# Patient Record
Sex: FEMALE | Race: WHITE | Employment: UNEMPLOYED | URBAN - METROPOLITAN AREA
[De-identification: names, ages, dates, MRNs, and addresses within clinical notes are randomized per-mention and may not be internally consistent; named-entity substitution may affect disease eponyms.]

---

## 2020-01-28 PROBLEM — Z78.9 ADMITTED TO SUBSTANCE MISUSE DETOXIFICATION CENTER: Status: ACTIVE | Noted: 2020-01-28

## 2020-08-05 ENCOUNTER — HOSPITAL ENCOUNTER (EMERGENCY)
Age: 30
Discharge: HOME | End: 2020-08-05
Attending: EMERGENCY MEDICINE
Payer: COMMERCIAL

## 2020-08-05 ENCOUNTER — APPOINTMENT (OUTPATIENT)
Dept: ULTRASOUND IMAGING | Age: 30
End: 2020-08-05
Payer: COMMERCIAL

## 2020-08-05 ENCOUNTER — APPOINTMENT (OUTPATIENT)
Dept: CT IMAGING | Age: 30
End: 2020-08-05
Payer: COMMERCIAL

## 2020-08-05 VITALS
RESPIRATION RATE: 14 BRPM | TEMPERATURE: 97.9 F | HEART RATE: 93 BPM | OXYGEN SATURATION: 98 % | DIASTOLIC BLOOD PRESSURE: 58 MMHG | SYSTOLIC BLOOD PRESSURE: 108 MMHG

## 2020-08-05 LAB
ABSOLUTE EOS #: 0.05 K/UL (ref 0–0.44)
ABSOLUTE IMMATURE GRANULOCYTE: 0.01 K/UL (ref 0–0.3)
ABSOLUTE LYMPH #: 2.26 K/UL (ref 1.1–3.7)
ABSOLUTE MONO #: 0.33 K/UL (ref 0.1–1.2)
ALBUMIN SERPL-MCNC: 3.6 G/DL (ref 3.5–5.2)
ALBUMIN/GLOBULIN RATIO: ABNORMAL (ref 1–2.5)
ALP BLD-CCNC: 100 U/L (ref 35–104)
ALT SERPL-CCNC: 120 U/L (ref 5–33)
ANION GAP SERPL CALCULATED.3IONS-SCNC: 11 MMOL/L (ref 9–17)
AST SERPL-CCNC: 69 U/L
BASOPHILS # BLD: 0 % (ref 0–2)
BASOPHILS ABSOLUTE: <0.03 K/UL (ref 0–0.2)
BILIRUB SERPL-MCNC: 0.33 MG/DL (ref 0.3–1.2)
BILIRUBIN DIRECT: 0.09 MG/DL
BILIRUBIN, INDIRECT: 0.24 MG/DL (ref 0–1)
BNP INTERPRETATION: ABNORMAL
BUN BLDV-MCNC: 4 MG/DL (ref 6–20)
BUN/CREAT BLD: 6 (ref 9–20)
CALCIUM SERPL-MCNC: 9.2 MG/DL (ref 8.6–10.4)
CHLORIDE BLD-SCNC: 101 MMOL/L (ref 98–107)
CO2: 27 MMOL/L (ref 20–31)
CREAT SERPL-MCNC: 0.7 MG/DL (ref 0.5–0.9)
DIFFERENTIAL TYPE: ABNORMAL
EOSINOPHILS RELATIVE PERCENT: 1 % (ref 1–4)
GFR AFRICAN AMERICAN: >60 ML/MIN
GFR NON-AFRICAN AMERICAN: >60 ML/MIN
GFR SERPL CREATININE-BSD FRML MDRD: ABNORMAL ML/MIN/{1.73_M2}
GFR SERPL CREATININE-BSD FRML MDRD: ABNORMAL ML/MIN/{1.73_M2}
GLOBULIN: ABNORMAL G/DL (ref 1.5–3.8)
GLUCOSE BLD-MCNC: 133 MG/DL (ref 70–99)
HCG QUALITATIVE: NEGATIVE
HCT VFR BLD CALC: 33.9 % (ref 36.3–47.1)
HEMOGLOBIN: 11.2 G/DL (ref 11.9–15.1)
IMMATURE GRANULOCYTES: 0 %
LIPASE: 34 U/L (ref 13–60)
LYMPHOCYTES # BLD: 55 % (ref 24–43)
MCH RBC QN AUTO: 32.8 PG (ref 25.2–33.5)
MCHC RBC AUTO-ENTMCNC: 33 G/DL (ref 28.4–34.8)
MCV RBC AUTO: 99.4 FL (ref 82.6–102.9)
MONOCYTES # BLD: 8 % (ref 3–12)
NRBC AUTOMATED: 0 PER 100 WBC
PDW BLD-RTO: 13.7 % (ref 11.8–14.4)
PLATELET # BLD: 152 K/UL (ref 138–453)
PLATELET ESTIMATE: ABNORMAL
PMV BLD AUTO: 8.6 FL (ref 8.1–13.5)
POTASSIUM SERPL-SCNC: 4.1 MMOL/L (ref 3.7–5.3)
PRO-BNP: 502 PG/ML
RBC # BLD: 3.41 M/UL (ref 3.95–5.11)
RBC # BLD: ABNORMAL 10*6/UL
SEG NEUTROPHILS: 36 % (ref 36–65)
SEGMENTED NEUTROPHILS ABSOLUTE COUNT: 1.5 K/UL (ref 1.5–8.1)
SODIUM BLD-SCNC: 139 MMOL/L (ref 135–144)
TOTAL PROTEIN: 6.6 G/DL (ref 6.4–8.3)
WBC # BLD: 4.2 K/UL (ref 3.5–11.3)
WBC # BLD: ABNORMAL 10*3/UL

## 2020-08-05 PROCEDURE — 6360000002 HC RX W HCPCS: Performed by: NURSE PRACTITIONER

## 2020-08-05 PROCEDURE — 74177 CT ABD & PELVIS W/CONTRAST: CPT

## 2020-08-05 PROCEDURE — 83880 ASSAY OF NATRIURETIC PEPTIDE: CPT

## 2020-08-05 PROCEDURE — 6360000004 HC RX CONTRAST MEDICATION: Performed by: NURSE PRACTITIONER

## 2020-08-05 PROCEDURE — 80048 BASIC METABOLIC PNL TOTAL CA: CPT

## 2020-08-05 PROCEDURE — 2580000003 HC RX 258: Performed by: NURSE PRACTITIONER

## 2020-08-05 PROCEDURE — 84703 CHORIONIC GONADOTROPIN ASSAY: CPT

## 2020-08-05 PROCEDURE — 96374 THER/PROPH/DIAG INJ IV PUSH: CPT

## 2020-08-05 PROCEDURE — 76705 ECHO EXAM OF ABDOMEN: CPT

## 2020-08-05 PROCEDURE — 80076 HEPATIC FUNCTION PANEL: CPT

## 2020-08-05 PROCEDURE — C9113 INJ PANTOPRAZOLE SODIUM, VIA: HCPCS | Performed by: NURSE PRACTITIONER

## 2020-08-05 PROCEDURE — 96375 TX/PRO/DX INJ NEW DRUG ADDON: CPT

## 2020-08-05 PROCEDURE — 85025 COMPLETE CBC W/AUTO DIFF WBC: CPT

## 2020-08-05 PROCEDURE — 83690 ASSAY OF LIPASE: CPT

## 2020-08-05 PROCEDURE — 99284 EMERGENCY DEPT VISIT MOD MDM: CPT

## 2020-08-05 RX ORDER — ONDANSETRON 2 MG/ML
4 INJECTION INTRAMUSCULAR; INTRAVENOUS ONCE
Status: COMPLETED | OUTPATIENT
Start: 2020-08-05 | End: 2020-08-05

## 2020-08-05 RX ORDER — SODIUM CHLORIDE 0.9 % (FLUSH) 0.9 %
10 SYRINGE (ML) INJECTION PRN
Status: DISCONTINUED | OUTPATIENT
Start: 2020-08-05 | End: 2020-08-05 | Stop reason: HOSPADM

## 2020-08-05 RX ORDER — BUPROPION HYDROCHLORIDE 300 MG/1
TABLET ORAL
Status: ON HOLD | COMMUNITY
End: 2021-03-24 | Stop reason: HOSPADM

## 2020-08-05 RX ORDER — SODIUM CHLORIDE 9 MG/ML
10 INJECTION INTRAVENOUS ONCE
Status: COMPLETED | OUTPATIENT
Start: 2020-08-05 | End: 2020-08-05

## 2020-08-05 RX ORDER — METRONIDAZOLE 500 MG/1
500 TABLET ORAL 3 TIMES DAILY
Qty: 30 TABLET | Refills: 0 | Status: SHIPPED | OUTPATIENT
Start: 2020-08-05 | End: 2020-08-15

## 2020-08-05 RX ORDER — VILAZODONE HYDROCHLORIDE 20 MG/1
20 TABLET ORAL DAILY
Status: ON HOLD | COMMUNITY
End: 2021-03-19 | Stop reason: HOSPADM

## 2020-08-05 RX ORDER — PANTOPRAZOLE SODIUM 40 MG/10ML
40 INJECTION, POWDER, LYOPHILIZED, FOR SOLUTION INTRAVENOUS ONCE
Status: COMPLETED | OUTPATIENT
Start: 2020-08-05 | End: 2020-08-05

## 2020-08-05 RX ORDER — GUANFACINE 2 MG/1
2 TABLET, EXTENDED RELEASE ORAL DAILY
Status: ON HOLD | COMMUNITY
End: 2021-03-24 | Stop reason: HOSPADM

## 2020-08-05 RX ORDER — CIPROFLOXACIN 500 MG/1
500 TABLET, FILM COATED ORAL 2 TIMES DAILY
Qty: 20 TABLET | Refills: 0 | Status: SHIPPED | OUTPATIENT
Start: 2020-08-05 | End: 2020-08-15

## 2020-08-05 RX ORDER — BUSPIRONE HYDROCHLORIDE 10 MG/1
5 TABLET ORAL 2 TIMES DAILY
Status: ON HOLD | COMMUNITY
End: 2021-03-24 | Stop reason: HOSPADM

## 2020-08-05 RX ADMIN — Medication 10 ML: at 14:05

## 2020-08-05 RX ADMIN — Medication 10 ML: at 13:23

## 2020-08-05 RX ADMIN — IOPAMIDOL 100 ML: 755 INJECTION, SOLUTION INTRAVENOUS at 14:05

## 2020-08-05 RX ADMIN — PANTOPRAZOLE SODIUM 40 MG: 40 INJECTION, POWDER, FOR SOLUTION INTRAVENOUS at 13:22

## 2020-08-05 RX ADMIN — ONDANSETRON 4 MG: 2 INJECTION INTRAMUSCULAR; INTRAVENOUS at 13:22

## 2020-08-05 ASSESSMENT — ENCOUNTER SYMPTOMS
COLOR CHANGE: 0
COUGH: 0
BACK PAIN: 0
NAUSEA: 1
DIARRHEA: 1
SORE THROAT: 0
ABDOMINAL PAIN: 1
VOMITING: 0
SHORTNESS OF BREATH: 0

## 2020-08-05 ASSESSMENT — PAIN DESCRIPTION - PAIN TYPE: TYPE: ACUTE PAIN

## 2020-08-05 ASSESSMENT — PAIN SCALES - GENERAL: PAINLEVEL_OUTOF10: 8

## 2020-08-05 NOTE — ED PROVIDER NOTES
with likely fatty infiltration. Spleen, pancreas, adrenal glands and kidneys appear unremarkable. Partially contracted gallbladder with slight wall thickening/enhancement. GI/Bowel: No evidence of bowel obstruction. Mild wall thickening/enhancement along the cecum and ascending colon with slight adjacent edema. Pelvis: Bladder and uterus appear unremarkable Peritoneum/Retroperitoneum: Normal caliber abdominal aorta. No suspicious lymphadenopathy. No ascites or free air. Bones/Soft Tissues: No significant osseous abnormality. 1. Mild wall thickening/enhancement along the cecum and ascending colon with slight adjacent edema suggesting colitis. 2. Slight wall thickening/enhancement of the gallbladder which may in part relate to partially contracted state. Consider further assessment with ultrasound as clinically warranted. 3. Borderline hepatomegaly with likely fatty infiltration which could also be further assessed with ultrasound. 4. Small benign cardiophrenic nodular densities as described. Us Gallbladder Ruq    Result Date: 8/5/2020  EXAMINATION: RIGHT UPPER QUADRANT ULTRASOUND 8/5/2020 2:59 pm COMPARISON: CT abdomen pelvis earlier same day HISTORY: ORDERING SYSTEM PROVIDED HISTORY: RUQ abdominal pain indeterminate CT FINDINGS: LIVER:  There is increased echogenicity of the liver suggesting fatty infiltration. No evidence of intrahepatic biliary ductal dilatation. Liver is mildly prominent measuring 20.1 cm. BILIARY SYSTEM:  Gallbladder is partially contracted but without evidence of pericholecystic fluid, significant wall thickening or stones. Negative sonographic Connell's sign. Common bile duct is within normal limits measuring 3 mm. RIGHT KIDNEY: The right kidney is grossly unremarkable without evidence of hydronephrosis. Right renal length is 11.2 cm. PANCREAS:  Visualized portions of the pancreas are unremarkable. OTHER: No evidence of right upper quadrant ascites.      Redemonstration of mild worsens. FINAL IMPRESSION      1. Abdominal pain, unspecified abdominal location    2.  Colitis            Problem List  Patient Active Problem List   Diagnosis Code    Admitted to substance misuse detoxification center Z78.9         DISPOSITION/PLAN   DISPOSITION Decision To Discharge 08/05/2020 03:51:44 PM      PATIENT REFERRED TO:   Call Aleyda Mcbride to establish care for follow up    Schedule an appointment as soon as possible for a visit       [facility] ED  [address] [facility]  [phone]          DISCHARGE MEDICATIONS:     New Prescriptions    CIPROFLOXACIN (CIPRO) 500 MG TABLET    Take 1 tablet by mouth 2 times daily for 10 days    METRONIDAZOLE (FLAGYL) 500 MG TABLET    Take 1 tablet by mouth 3 times daily for 10 days           (Please note that portions of this note were completed with a voice recognition program.  Efforts were made to edit the dictations but occasionally words are mis-transcribed.)    RENARD Llanes CNP, APRN - CNP  08/05/20 7076

## 2020-08-05 NOTE — ED PROVIDER NOTES
EMERGENCY DEPARTMENT ENCOUNTER   ATTENDING ATTESTATION     Pt Name: Yuli Alanis  MRN: [id number]  [place] 1990  Date of evaluation: 8/5/20       Yuli Alanis is a 31 y.o. female who presents with Leg Swelling and Abdominal Pain (hep c)      MDM:   This is a 58-year-old female who is currently in rehab for fentanyl who comes in today with abdominal pain and leg swelling. Mild global abdominal tenderness. 3:52 PM EDT  CT scan reveals a compressed gallbladder but ultrasound of the gallbladder does not reveal any cholecystitis. The patient does have evidence of colitis on her CT scan. However, there is no leukocytosis she has no evidence of sepsis at this time. We will discharge with Cipro Flagyl      Vitals:   Vitals:    08/05/20 1240   BP: (!) 108/58   Pulse: 93   Resp: 14   Temp: 97.9 °F (36.6 °C)   SpO2: 98%         I personally evaluated and examined the patient in conjunction with the resident and agree with the assessment, treatment plan, and disposition of the patient as recorded by the resident. I performed a history and physical examination of the patient and discussed management with the resident. I reviewed the residents note and agree with the documented findings and plan of care. Any areas of disagreement are noted on the chart. I was personally present for the key portions of any procedures. I have documented in the chart those procedures where I was not present during the key portions. I have personally reviewed all images and agree with the resident's interpretation. I have reviewed the emergency nurses triage note. I agree with the chief complaint, past medical history, past surgical history, allergies, medications, social and family history as documented unless otherwise noted.     Mckenna Cesar MD  Attending Emergency Physician            Mckenna Cesar MD  08/05/20 9445

## 2020-08-05 NOTE — ED NOTES
Pt ambulatory to room 19. Pt c/o right-sided abdominal pain and bilateral leg swelling x1 week. Pt denies N/V and diarrhea. Pt states she vomited once 2 days ago, and her stool this morning was darker than usual. Pt has a hx of hepatitis C. Pt A&O x3, respirations equal and unlabored bilat, skin warm and dry.       Ambreen Rose, RN  08/05/20 0857

## 2020-08-21 ENCOUNTER — APPOINTMENT (OUTPATIENT)
Dept: ULTRASOUND IMAGING | Age: 30
End: 2020-08-21
Payer: COMMERCIAL

## 2020-08-21 ENCOUNTER — HOSPITAL ENCOUNTER (EMERGENCY)
Age: 30
Discharge: HOME | End: 2020-08-21
Attending: EMERGENCY MEDICINE
Payer: COMMERCIAL

## 2020-08-21 VITALS
OXYGEN SATURATION: 97 % | BODY MASS INDEX: 31.4 KG/M2 | HEART RATE: 92 BPM | WEIGHT: 183.9 LBS | HEIGHT: 64 IN | RESPIRATION RATE: 14 BRPM | DIASTOLIC BLOOD PRESSURE: 74 MMHG | TEMPERATURE: 98.2 F | SYSTOLIC BLOOD PRESSURE: 130 MMHG

## 2020-08-21 LAB
ABSOLUTE EOS #: <0.03 K/UL (ref 0–0.44)
ABSOLUTE IMMATURE GRANULOCYTE: 0.04 K/UL (ref 0–0.3)
ABSOLUTE LYMPH #: 2.87 K/UL (ref 1.1–3.7)
ABSOLUTE MONO #: 0.53 K/UL (ref 0.1–1.2)
ALBUMIN SERPL-MCNC: 4.2 G/DL (ref 3.5–5.2)
ALBUMIN/GLOBULIN RATIO: ABNORMAL (ref 1–2.5)
ALP BLD-CCNC: 130 U/L (ref 35–104)
ALT SERPL-CCNC: 79 U/L (ref 5–33)
AMYLASE: 89 U/L (ref 28–100)
ANION GAP SERPL CALCULATED.3IONS-SCNC: 12 MMOL/L (ref 9–17)
AST SERPL-CCNC: 60 U/L
BASOPHILS # BLD: 0 % (ref 0–2)
BASOPHILS ABSOLUTE: <0.03 K/UL (ref 0–0.2)
BILIRUB SERPL-MCNC: 0.3 MG/DL (ref 0.3–1.2)
BILIRUBIN DIRECT: <0.08 MG/DL
BILIRUBIN, INDIRECT: ABNORMAL MG/DL (ref 0–1)
BUN BLDV-MCNC: 13 MG/DL (ref 6–20)
BUN/CREAT BLD: 15 (ref 9–20)
CALCIUM SERPL-MCNC: 9.3 MG/DL (ref 8.6–10.4)
CHLORIDE BLD-SCNC: 101 MMOL/L (ref 98–107)
CO2: 24 MMOL/L (ref 20–31)
CREAT SERPL-MCNC: 0.84 MG/DL (ref 0.5–0.9)
DIFFERENTIAL TYPE: ABNORMAL
EOSINOPHILS RELATIVE PERCENT: 0 % (ref 1–4)
GFR AFRICAN AMERICAN: >60 ML/MIN
GFR NON-AFRICAN AMERICAN: >60 ML/MIN
GFR SERPL CREATININE-BSD FRML MDRD: ABNORMAL ML/MIN/{1.73_M2}
GFR SERPL CREATININE-BSD FRML MDRD: ABNORMAL ML/MIN/{1.73_M2}
GLOBULIN: ABNORMAL G/DL (ref 1.5–3.8)
GLUCOSE BLD-MCNC: 105 MG/DL (ref 70–99)
HCT VFR BLD CALC: 39.8 % (ref 36.3–47.1)
HEMOGLOBIN: 13.3 G/DL (ref 11.9–15.1)
IMMATURE GRANULOCYTES: 1 %
LIPASE: 73 U/L (ref 13–60)
LYMPHOCYTES # BLD: 35 % (ref 24–43)
MCH RBC QN AUTO: 33 PG (ref 25.2–33.5)
MCHC RBC AUTO-ENTMCNC: 33.4 G/DL (ref 28.4–34.8)
MCV RBC AUTO: 98.8 FL (ref 82.6–102.9)
MONOCYTES # BLD: 7 % (ref 3–12)
NRBC AUTOMATED: 0 PER 100 WBC
PDW BLD-RTO: 13.6 % (ref 11.8–14.4)
PLATELET # BLD: 224 K/UL (ref 138–453)
PLATELET ESTIMATE: ABNORMAL
PMV BLD AUTO: 8.3 FL (ref 8.1–13.5)
POTASSIUM SERPL-SCNC: 4.4 MMOL/L (ref 3.7–5.3)
RBC # BLD: 4.03 M/UL (ref 3.95–5.11)
RBC # BLD: ABNORMAL 10*6/UL
SEG NEUTROPHILS: 57 % (ref 36–65)
SEGMENTED NEUTROPHILS ABSOLUTE COUNT: 4.67 K/UL (ref 1.5–8.1)
SODIUM BLD-SCNC: 137 MMOL/L (ref 135–144)
TOTAL PROTEIN: 7.6 G/DL (ref 6.4–8.3)
WBC # BLD: 8.2 K/UL (ref 3.5–11.3)
WBC # BLD: ABNORMAL 10*3/UL

## 2020-08-21 PROCEDURE — 85025 COMPLETE CBC W/AUTO DIFF WBC: CPT

## 2020-08-21 PROCEDURE — 82150 ASSAY OF AMYLASE: CPT

## 2020-08-21 PROCEDURE — 83690 ASSAY OF LIPASE: CPT

## 2020-08-21 PROCEDURE — 96374 THER/PROPH/DIAG INJ IV PUSH: CPT

## 2020-08-21 PROCEDURE — 6360000002 HC RX W HCPCS: Performed by: NURSE PRACTITIONER

## 2020-08-21 PROCEDURE — 76705 ECHO EXAM OF ABDOMEN: CPT

## 2020-08-21 PROCEDURE — 80076 HEPATIC FUNCTION PANEL: CPT

## 2020-08-21 PROCEDURE — 2580000003 HC RX 258: Performed by: NURSE PRACTITIONER

## 2020-08-21 PROCEDURE — 96372 THER/PROPH/DIAG INJ SC/IM: CPT

## 2020-08-21 PROCEDURE — 99284 EMERGENCY DEPT VISIT MOD MDM: CPT

## 2020-08-21 PROCEDURE — 80048 BASIC METABOLIC PNL TOTAL CA: CPT

## 2020-08-21 RX ORDER — DICYCLOMINE HYDROCHLORIDE 10 MG/ML
20 INJECTION INTRAMUSCULAR ONCE
Status: COMPLETED | OUTPATIENT
Start: 2020-08-21 | End: 2020-08-21

## 2020-08-21 RX ORDER — ONDANSETRON 4 MG/1
4 TABLET, ORALLY DISINTEGRATING ORAL EVERY 8 HOURS PRN
Qty: 20 TABLET | Refills: 0 | Status: SHIPPED | OUTPATIENT
Start: 2020-08-21 | End: 2020-09-04

## 2020-08-21 RX ORDER — 0.9 % SODIUM CHLORIDE 0.9 %
1000 INTRAVENOUS SOLUTION INTRAVENOUS ONCE
Status: COMPLETED | OUTPATIENT
Start: 2020-08-21 | End: 2020-08-21

## 2020-08-21 RX ORDER — ONDANSETRON 2 MG/ML
4 INJECTION INTRAMUSCULAR; INTRAVENOUS ONCE
Status: COMPLETED | OUTPATIENT
Start: 2020-08-21 | End: 2020-08-21

## 2020-08-21 RX ORDER — DICYCLOMINE HYDROCHLORIDE 10 MG/1
10 CAPSULE ORAL EVERY 6 HOURS PRN
Qty: 20 CAPSULE | Refills: 0 | Status: SHIPPED | OUTPATIENT
Start: 2020-08-21 | End: 2020-09-04

## 2020-08-21 RX ADMIN — SODIUM CHLORIDE 1000 ML: 9 INJECTION, SOLUTION INTRAVENOUS at 10:41

## 2020-08-21 RX ADMIN — DICYCLOMINE HYDROCHLORIDE 20 MG: 10 INJECTION INTRAMUSCULAR at 12:12

## 2020-08-21 RX ADMIN — ONDANSETRON 4 MG: 2 INJECTION INTRAMUSCULAR; INTRAVENOUS at 10:26

## 2020-08-21 ASSESSMENT — ENCOUNTER SYMPTOMS
COUGH: 0
CONSTIPATION: 0
DIARRHEA: 0
VOMITING: 1
WHEEZING: 0
SINUS PRESSURE: 0
SORE THROAT: 0
RHINORRHEA: 0
ABDOMINAL PAIN: 0
SHORTNESS OF BREATH: 0
NAUSEA: 1
COLOR CHANGE: 0

## 2020-08-21 ASSESSMENT — PAIN SCALES - GENERAL: PAINLEVEL_OUTOF10: 5

## 2020-08-21 NOTE — ED PROVIDER NOTES
[address] [place] [address] ED  eMERGENCY dEPARTMENT eNCOUnter      Pt Name: Arthur Valdez  MRN: [id number]  [place] 1990  Date of evaluation: 8/21/2020  Provider: [address] [place] [address] NP, RENARD Latham 0573       Chief Complaint   Patient presents with    Abdominal Pain    Emesis         HISTORY OF PRESENT ILLNESS  (Location/Symptom, Timing/Onset, Context/Setting, Quality, Duration, Modifying Factors, Severity.)   Arthur Valdez is a 31 y.o. female who presents to the emergency department by private vehicle for evaluation of nausea vomiting and some abdominal discomfort. Patient was seen and evaluated here on 5 August for abdominal pain and dark stools. She was found to have colitis at that time. She was discharged home on Cipro and Flagyl. She states she would take the medication as prescribed. She states in the last day of taking the antibiotic she started having nausea and vomiting. She states that she vomits roughly 2 or 3 times a day. She still has some pain in her lower abdomen but she also has some pain in her right upper abdomen as well. She also has a burning sensation to the middle of her chest.  She denies any history of any GI problems except for hepatitis C. She states that she has not had a measured fever but she has had some hot and cold sweats. Nursing Notes were reviewed.     ALLERGIES     Aspirin; Codeine; and Motrin [ibuprofen]    CURRENT MEDICATIONS       Discharge Medication List as of 8/21/2020 11:46 AM      CONTINUE these medications which have NOT CHANGED    Details   busPIRone (BUSPAR) 10 MG tablet Take 5 mg by mouth 2 times dailyHistorical Med      buPROPion (WELLBUTRIN XL) 300 MG extended release tablet 1 tablet in the morningHistorical Med      vilazodone HCl (VILAZODONE HCL) 20 MG TABS Take 20 mg by mouth dailyHistorical Med      guanFACINE (INTUNIV) 2 MG TB24 extended release tablet Take 2 mg by mouth dailyHistorical Med      lamoTRIgine (LAMICTAL) 100 MG tablet Take 75 mg by mouth 2 times daily Historical Med      traZODone (DESYREL) 100 MG tablet Take 150 mg by mouth nightly Historical Med             PAST MEDICAL HISTORY         Diagnosis Date    Anxiety     Borderline personality disorder ([address]     Depression     Disease of blood and blood forming organ     Drug use     Hepatitis C     History of iron deficiency anemia     Liver disease        SURGICAL HISTORY           Procedure Laterality Date    APPENDECTOMY      APPENDECTOMY Right          FAMILY HISTORY     History reviewed. No pertinent family history. No family status information on file. SOCIAL HISTORY      reports that she has been smoking cigarettes. She has been smoking about 1.00 pack per day. She has never used smokeless tobacco. She reports current drug use. Drugs: IV, Opiates , and Marijuana. She reports that she does not drink alcohol. REVIEW OF SYSTEMS    (2-9 systems for level 4, 10 or more for level 5)     Review of Systems   Constitutional: Negative for chills, fever and unexpected weight change. HENT: Negative for congestion, rhinorrhea, sinus pressure and sore throat. Respiratory: Negative for cough, shortness of breath and wheezing. Cardiovascular: Negative for chest pain and palpitations. Gastrointestinal: Positive for nausea and vomiting. Negative for abdominal pain, constipation and diarrhea. Genitourinary: Negative for dysuria and hematuria. Musculoskeletal: Negative for arthralgias and myalgias. Skin: Negative for color change and rash. Neurological: Negative for dizziness, weakness and headaches. Hematological: Negative for adenopathy. All other systems reviewed and are negative. Except as noted above the remainder of the review of systems was reviewed and negative.      PHYSICAL EXAM    (up to 7 for level 4, 8 or more for level 5)     ED Triage Vitals   BP Temp Temp src Pulse Resp SpO2 Height Weight   08/21/20 1000 08/21/20 1000 -- 08/21/20 1000 08/21/20 1000 08/21/20 1000 08/21/20 0959 08/21/20 0959   130/74 98.2 °F (36.8 °C)  92 14 97 % 5' 4\" (1.626 m) 183 lb 14.4 oz (83.4 kg)       Physical Exam  Vitals signs reviewed. Constitutional:       Appearance: She is well-developed. HENT:      Head: Normocephalic and atraumatic. Eyes:      Conjunctiva/sclera: Conjunctivae normal.      Pupils: Pupils are equal, round, and reactive to light. Neck:      Musculoskeletal: Normal range of motion and neck supple. Cardiovascular:      Rate and Rhythm: Normal rate and regular rhythm. Pulmonary:      Effort: Pulmonary effort is normal. No respiratory distress. Breath sounds: Normal breath sounds. No stridor. Abdominal:      General: Bowel sounds are normal.      Palpations: Abdomen is soft. Musculoskeletal: Normal range of motion. Lymphadenopathy:      Cervical: No cervical adenopathy. Skin:     General: Skin is warm and dry. Findings: No rash. Neurological:      Mental Status: She is alert and oriented to person, place, and time. RADIOLOGY:   Non-plain film images such as CT, Ultrasound and MRI are read by the radiologist. Irina Bevel radiographic images are visualized and preliminarily interpreted by the emergency physician with the below findings:    Ct Abdomen Pelvis W Iv Contrast Additional Contrast? None    Result Date: 8/5/2020  EXAMINATION: CT OF THE ABDOMEN AND PELVIS WITH CONTRAST 8/5/2020 2:01 pm TECHNIQUE: CT of the abdomen and pelvis was performed with the administration of intravenous contrast. Multiplanar reformatted images are provided for review. Dose modulation, iterative reconstruction, and/or weight based adjustment of the mA/kV was utilized to reduce the radiation dose to as low as reasonably achievable. COMPARISON: None.  HISTORY: ORDERING SYSTEM PROVIDED HISTORY: abdominal pain h/o hep c TECHNOLOGIST PROVIDED HISTORY: abdominal pain h/o hep c Reason for Exam: Pt c/o right-sided abdominal pain and bilateral leg swelling consistent with hepatic steatosis. No focal liver lesion. No intrahepatic biliary ductal dilatation. The portal vein demonstrates normal direction and flow. BILIARY SYSTEM:  The gallbladder is contracted. No gallstones, gallbladder wall thickening or pericholecystic fluid. The sonographer reports negative Connell's sign upon transducer pressure over the gallbladder. Common bile duct is within normal limits measuring 4 mm. RIGHT KIDNEY: The right kidney is grossly unremarkable without evidence of hydronephrosis. PANCREAS:  Visualized portions of the pancreas are unremarkable. OTHER: No evidence of right upper quadrant ascites. Contracted gallbladder there is otherwise unremarkable. Moderate hepatomegaly probably related to hepatic steatosis. Us Gallbladder Ruq    Result Date: 8/5/2020  EXAMINATION: RIGHT UPPER QUADRANT ULTRASOUND 8/5/2020 2:59 pm COMPARISON: CT abdomen pelvis earlier same day HISTORY: ORDERING SYSTEM PROVIDED HISTORY: RUQ abdominal pain indeterminate CT FINDINGS: LIVER:  There is increased echogenicity of the liver suggesting fatty infiltration. No evidence of intrahepatic biliary ductal dilatation. Liver is mildly prominent measuring 20.1 cm. BILIARY SYSTEM:  Gallbladder is partially contracted but without evidence of pericholecystic fluid, significant wall thickening or stones. Negative sonographic Connell's sign. Common bile duct is within normal limits measuring 3 mm. RIGHT KIDNEY: The right kidney is grossly unremarkable without evidence of hydronephrosis. Right renal length is 11.2 cm. PANCREAS:  Visualized portions of the pancreas are unremarkable. OTHER: No evidence of right upper quadrant ascites. Redemonstration of mild hepatomegaly with fatty infiltration as well as partially contracted gallbladder. No sonographic evidence of cholelithiasis or cholecystitis.      Interpretation per the Radiologist below, if available at the time of this note:    [address] [employer] Result   Contracted gallbladder there is otherwise unremarkable. Moderate hepatomegaly probably related to hepatic steatosis. LABS:  Labs Reviewed   CBC WITH AUTO DIFFERENTIAL - Abnormal; Notable for the following components:       Result Value    Eosinophils % 0 (*)     Immature Granulocytes 1 (*)     All other components within normal limits   BASIC METABOLIC PANEL - Abnormal; Notable for the following components:    Glucose 105 (*)     All other components within normal limits   LIPASE - Abnormal; Notable for the following components:    Lipase 73 (*)     All other components within normal limits   HEPATIC FUNCTION PANEL - Abnormal; Notable for the following components:    Alkaline Phosphatase 130 (*)     ALT 79 (*)     AST 60 (*)     All other components within normal limits   AMYLASE       All other labs were within normal range or not returned as of this dictation. EMERGENCY DEPARTMENT COURSE and DIFFERENTIAL DIAGNOSIS/MDM:   Vitals:    Vitals:    08/21/20 0959 08/21/20 1000   BP:  130/74   Pulse:  92   Resp:  14   Temp:  98.2 °F (36.8 °C)   SpO2:  97%   Weight: 183 lb 14.4 oz (83.4 kg)    Height: 5' 4\" (1.626 m)        Medical Decision Making: Patient feels improvement of symptoms after medications provided. To be discharged home on some oral Zofran and Bentyl. Follow-up with primary care physician for recheck reevaluation. Return to emergency room for worsening abdominal pain, fever, vomiting or any other concerns  Medications   0.9 % sodium chloride bolus (0 mLs Intravenous Stopped 8/21/20 1157)   ondansetron (ZOFRAN) injection 4 mg (4 mg Intravenous Given 8/21/20 1026)   dicyclomine (BENTYL) injection 20 mg (20 mg Intramuscular Given 8/21/20 1212)       FINAL IMPRESSION      1.  Nausea and vomiting, intractability of vomiting not specified, unspecified vomiting type          DISPOSITION/PLAN   DISPOSITION Decision To Discharge 08/21/2020 11:46:12 AM      PATIENT REFERRED TO:

## 2020-08-21 NOTE — ED PROVIDER NOTES
EMERGENCY DEPARTMENT ENCOUNTER   ATTENDING ATTESTATION     Pt Name: Noreen Amin  MRN: [id number]  [place] 1990  Date of evaluation: 8/21/20   Noreen Amin is a 31 y.o. female with CC: Abdominal Pain and Emesis    MDM:            CRITICAL CARE:       EKG: All EKG's are interpreted by the Emergency Department Physician who either signs or Co-signs this chart in the absence of a cardiologist.      RADIOLOGY:All plain film, CT, MRI, and formal ultrasound images (except ED bedside ultrasound) are read by the radiologist, see reports below, unless otherwise noted in MDM or here. US GALLBLADDER RUQ   Final Result   Contracted gallbladder there is otherwise unremarkable. Moderate hepatomegaly probably related to hepatic steatosis. LABS: All lab results were reviewed by myself, and all abnormals are listed below. Labs Reviewed   CBC WITH AUTO DIFFERENTIAL - Abnormal; Notable for the following components:       Result Value    Eosinophils % 0 (*)     Immature Granulocytes 1 (*)     All other components within normal limits   BASIC METABOLIC PANEL - Abnormal; Notable for the following components:    Glucose 105 (*)     All other components within normal limits   LIPASE - Abnormal; Notable for the following components:    Lipase 73 (*)     All other components within normal limits   HEPATIC FUNCTION PANEL - Abnormal; Notable for the following components:    Alkaline Phosphatase 130 (*)     ALT 79 (*)     AST 60 (*)     All other components within normal limits   AMYLASE     CONSULTS:  None  FINAL IMPRESSION    No diagnosis found.         PASTMEDICAL HISTORY     Past Medical History:   Diagnosis Date    Anxiety     Borderline personality disorder ([facility] [address]     Depression     Disease of blood and blood forming organ     Drug use     Hepatitis C     History of iron deficiency anemia     Liver disease      SURGICAL HISTORY       Past Surgical History:   Procedure Laterality Date    APPENDECTOMY      APPENDECTOMY Right      CURRENT MEDICATIONS       Previous Medications    BUPROPION (WELLBUTRIN XL) 300 MG EXTENDED RELEASE TABLET    1 tablet in the morning    BUSPIRONE (BUSPAR) 10 MG TABLET    Take 5 mg by mouth 2 times daily    GUANFACINE (INTUNIV) 2 MG TB24 EXTENDED RELEASE TABLET    Take 2 mg by mouth daily    LAMOTRIGINE (LAMICTAL) 100 MG TABLET    Take 75 mg by mouth 2 times daily     TRAZODONE (DESYREL) 100 MG TABLET    Take 150 mg by mouth nightly     VILAZODONE HCL (VILAZODONE HCL) 20 MG TABS    Take 20 mg by mouth daily     ALLERGIES     is allergic to aspirin; codeine; and motrin [ibuprofen]. FAMILY HISTORY     has no family status information on file. SOCIAL HISTORY       Social History     Tobacco Use    Smoking status: Current Every Day Smoker     Packs/day: 1.00     Types: Cigarettes    Smokeless tobacco: Never Used    Tobacco comment: 0.5 ppd   Substance Use Topics    Alcohol use: No    Drug use: Yes     Types: IV, Opiates , Marijuana     Comment: IV/ heroin/fentanyl- 0.5 g/ day/ 5 months/ last used 1-28-20 @ noon; smoke 1 gram of marjuana/day; sober for 1 year, relapsed 1/2019; started use 5 yrs ago and a became a problem then; no current legal issues       I personally evaluated and examined the patient in conjunction with the APC and agree with the assessment, treatment plan, and disposition of the patient as recorded by the APC.    Gabriella Farley MD  Attending Emergency Physician        Hetal Lewis MD  08/21/20 7708

## 2020-08-25 ENCOUNTER — OFFICE VISIT (OUTPATIENT)
Dept: INTERNAL MEDICINE CLINIC | Age: 30
End: 2020-08-25
Payer: COMMERCIAL

## 2020-08-25 VITALS
DIASTOLIC BLOOD PRESSURE: 67 MMHG | TEMPERATURE: 97.1 F | SYSTOLIC BLOOD PRESSURE: 109 MMHG | WEIGHT: 180 LBS | HEART RATE: 76 BPM | OXYGEN SATURATION: 98 % | BODY MASS INDEX: 30.9 KG/M2

## 2020-08-25 PROBLEM — F90.9 ATTENTION DEFICIT HYPERACTIVITY DISORDER (ADHD): Status: ACTIVE | Noted: 2020-08-25

## 2020-08-25 PROBLEM — R79.89 ELEVATED LFTS: Status: ACTIVE | Noted: 2020-08-25

## 2020-08-25 PROBLEM — F11.10 DRUG ABUSE, OPIOID TYPE (HCC): Status: ACTIVE | Noted: 2020-08-25

## 2020-08-25 PROBLEM — Z72.0 USE OF NICOTINE CONTAINING SUBSTANCE IN COMBUSTION-FREE VAPORIZATION DEVICE: Status: ACTIVE | Noted: 2020-08-25

## 2020-08-25 PROBLEM — F60.3 BORDERLINE PERSONALITY DISORDER IN ADULT (HCC): Status: ACTIVE | Noted: 2020-08-25

## 2020-08-25 PROBLEM — K52.9 COLITIS: Status: ACTIVE | Noted: 2020-08-25

## 2020-08-25 PROCEDURE — 99204 OFFICE O/P NEW MOD 45 MIN: CPT | Performed by: FAMILY MEDICINE

## 2020-08-25 PROCEDURE — G8417 CALC BMI ABV UP PARAM F/U: HCPCS | Performed by: FAMILY MEDICINE

## 2020-08-25 PROCEDURE — 1036F TOBACCO NON-USER: CPT | Performed by: FAMILY MEDICINE

## 2020-08-25 PROCEDURE — G8427 DOCREV CUR MEDS BY ELIG CLIN: HCPCS | Performed by: FAMILY MEDICINE

## 2020-08-25 SDOH — ECONOMIC STABILITY: FOOD INSECURITY: WITHIN THE PAST 12 MONTHS, YOU WORRIED THAT YOUR FOOD WOULD RUN OUT BEFORE YOU GOT MONEY TO BUY MORE.: NEVER TRUE

## 2020-08-25 SDOH — ECONOMIC STABILITY: INCOME INSECURITY: HOW HARD IS IT FOR YOU TO PAY FOR THE VERY BASICS LIKE FOOD, HOUSING, MEDICAL CARE, AND HEATING?: NOT HARD AT ALL

## 2020-08-25 SDOH — ECONOMIC STABILITY: FOOD INSECURITY: WITHIN THE PAST 12 MONTHS, THE FOOD YOU BOUGHT JUST DIDN'T LAST AND YOU DIDN'T HAVE MONEY TO GET MORE.: NEVER TRUE

## 2020-08-25 ASSESSMENT — ENCOUNTER SYMPTOMS
ABDOMINAL PAIN: 1
RESPIRATORY NEGATIVE: 1
ALLERGIC/IMMUNOLOGIC NEGATIVE: 1
EYES NEGATIVE: 1

## 2020-08-25 NOTE — PROGRESS NOTES
Subjective:      Patient ID: Rodolfo Lopez is a 31 y.o. female. Abdominal Pain   This is a chronic problem. The current episode started 1 to 4 weeks ago. The onset quality is undetermined. The problem occurs intermittently. The problem has been waxing and waning. The pain is located in the generalized abdominal region. The pain is at a severity of 4/10. The pain is moderate. The quality of the pain is cramping. The abdominal pain does not radiate. Exacerbated by: Stress. She has tried antibiotics and antacids for the symptoms. The treatment provided moderate relief. Prior diagnostic workup includes CT scan. Descending colon colitis       Review of Systems   Constitutional: Negative. HENT: Negative. Eyes: Negative. Respiratory: Negative. Cardiovascular: Negative. Gastrointestinal: Positive for abdominal pain. Endocrine: Negative. Musculoskeletal: Negative. Skin: Negative. Allergic/Immunologic: Negative. Neurological: Negative. Hematological: Negative. Psychiatric/Behavioral: Positive for behavioral problems, decreased concentration and dysphoric mood. Past family and social history unremarkable. Diagnosis Orders   1. Encounter to establish care with new doctor     2. Sherri Duarte MD, Gastroenterology, [address]   3. Drug abuse, opioid type ([facility] [address]     4. Borderline personality disorder in adult ([facility] [address]     5. Attention deficit hyperactivity disorder (ADHD), unspecified ADHD type     6. Use of nicotine containing substance in combustion-free vaporization device  Jenn Garcia MD, Gastroenterology, [address]   7. Elevated LFTs  Jenn Garcia MD, Gastroenterology, [address]         Objective:   Physical Exam  Vitals signs and nursing note reviewed. Constitutional:       Appearance: She is well-developed. Comments: Obesity   HENT:      Head: Normocephalic and atraumatic.       Right Ear: External ear normal.      Left Ear: External ear normal.   Eyes:      Conjunctiva/sclera: Conjunctivae normal.      Pupils: Pupils are equal, round, and reactive to light. Neck:      Musculoskeletal: Normal range of motion and neck supple. Cardiovascular:      Rate and Rhythm: Normal rate and regular rhythm. Heart sounds: Normal heart sounds. Pulmonary:      Effort: Pulmonary effort is normal.      Breath sounds: Normal breath sounds. Abdominal:      General: Bowel sounds are normal.      Palpations: Abdomen is soft. Comments: CT positive colitis treated with Cipro and Flagyl   Genitourinary:     Vagina: Normal.   Musculoskeletal: Normal range of motion. Skin:     General: Skin is warm and dry. Neurological:      Mental Status: She is alert and oriented to person, place, and time. Deep Tendon Reflexes: Reflexes are normal and symmetric. Psychiatric:      Comments: Borderline personality disorder  ADHD  Intravenous fentanyl abuse. She is going through inpatient detox         Assessment:       Diagnosis Orders   1. Encounter to establish care with new doctor     2. Owen Celestin MD, Gastroenterology, [address]   3. Drug abuse, opioid type ([facility] [address]     4. Borderline personality disorder in adult ([facility] [address]     5. Attention deficit hyperactivity disorder (ADHD), unspecified ADHD type     6. Use of nicotine containing substance in combustion-free vaporization device  Oliverio Ayala MD, Gastroenterology, [address]   7. Elevated LFTs  Oliverio Ayala MD, Gastroenterology, [address]           Plan:      76-year-old  female is presented to establish care. She was recently admitted and discharged from [facility] with CT scan scan showing descending colon colitis for which she was treated with Cipro and Flagyl. She has significant improvement with occasional aches and pain without diarrhea constipation or tarry stool. She will be scheduled with GI  Elevated LFTs.

## 2020-09-04 ENCOUNTER — OFFICE VISIT (OUTPATIENT)
Dept: GASTROENTEROLOGY | Age: 30
End: 2020-09-04
Payer: COMMERCIAL

## 2020-09-04 VITALS — WEIGHT: 188 LBS | BODY MASS INDEX: 32.27 KG/M2

## 2020-09-04 PROCEDURE — 99203 OFFICE O/P NEW LOW 30 MIN: CPT | Performed by: INTERNAL MEDICINE

## 2020-09-04 PROCEDURE — G8427 DOCREV CUR MEDS BY ELIG CLIN: HCPCS | Performed by: INTERNAL MEDICINE

## 2020-09-04 PROCEDURE — G8417 CALC BMI ABV UP PARAM F/U: HCPCS | Performed by: INTERNAL MEDICINE

## 2020-09-04 PROCEDURE — 1036F TOBACCO NON-USER: CPT | Performed by: INTERNAL MEDICINE

## 2020-09-04 RX ORDER — HYOSCYAMINE SULFATE 0.125 MG
125 TABLET ORAL EVERY 4 HOURS PRN
Qty: 180 TABLET | Refills: 3 | Status: SHIPPED | OUTPATIENT
Start: 2020-09-04 | End: 2020-11-10

## 2020-09-04 ASSESSMENT — ENCOUNTER SYMPTOMS
RESPIRATORY NEGATIVE: 1
CHOKING: 0
SINUS PRESSURE: 0
TROUBLE SWALLOWING: 0
ABDOMINAL PAIN: 1
ANAL BLEEDING: 0
SORE THROAT: 0
NAUSEA: 0
ABDOMINAL DISTENTION: 1
VOICE CHANGE: 0
BACK PAIN: 0
RECTAL PAIN: 0
COUGH: 0
CONSTIPATION: 0
VOMITING: 0
DIARRHEA: 0
BLOOD IN STOOL: 0
ALLERGIC/IMMUNOLOGIC NEGATIVE: 1
EYES NEGATIVE: 1
WHEEZING: 0

## 2020-09-04 NOTE — PROGRESS NOTES
capsule, Take 1 capsule by mouth every 6 hours as needed (cramps), Disp: 20 capsule, Rfl: 0    busPIRone (BUSPAR) 10 MG tablet, Take 5 mg by mouth 2 times daily, Disp: , Rfl:     buPROPion (WELLBUTRIN XL) 300 MG extended release tablet, 1 tablet in the morning, Disp: , Rfl:     vilazodone HCl (VILAZODONE HCL) 20 MG TABS, Take 20 mg by mouth daily, Disp: , Rfl:     guanFACINE (INTUNIV) 2 MG TB24 extended release tablet, Take 2 mg by mouth daily, Disp: , Rfl:     lamoTRIgine (LAMICTAL) 100 MG tablet, Take 75 mg by mouth 2 times daily , Disp: , Rfl:     traZODone (DESYREL) 100 MG tablet, Take 150 mg by mouth nightly , Disp: , Rfl:     ALLERGIES:   Allergies   Allergen Reactions    Aspirin Anaphylaxis     Swelling, difficulty breathing    Codeine Anaphylaxis     Swelling, difficulty breathing    Ibuprofen Nausea And Vomiting     Facial swelling, SOB       FAMILY HISTORY:       Problem Relation Age of Onset    No Known Problems Mother         no health problems    No Known Problems Father         no health problems          SOCIAL HISTORY:   Social History     Socioeconomic History    Marital status: Single     Spouse name: Not on file    Number of children: Not on file    Years of education: Not on file    Highest education level: Not on file   Occupational History    Not on file   Social Needs    Financial resource strain: Not hard at all   [employer]ong[employer] insecurity     Worry: Never true     Inability: Never true   [employer] needs     Medical: Not on file     Non-medical: Not on file   Tobacco Use    Smoking status: Former Smoker     Packs/day: 1.00     Types: Cigarettes    Smokeless tobacco: Never Used    Tobacco comment: 0.5 ppd   Substance and Sexual Activity    Alcohol use: No    Drug use: Yes     Types: IV, Opiates , Marijuana     Comment: IV/ heroin/fentanyl- 0.5 g/ day/ 5 months/ last used 1-28-20 @ noon; smoke 1 gram of marjuana/day; sober for 1 year, relapsed 1/2019; started use 5 yrs ago and a became a problem then; no current legal issues    Sexual activity: Yes     Partners: Female   Lifestyle    Physical activity     Days per week: Not on file     Minutes per session: Not on file    Stress: Not on file   Relationships    Social connections     Talks on phone: Not on file     Gets together: Not on file     Attends Mandaen service: Not on file     Active member of club or organization: Not on file     Attends meetings of clubs or organizations: Not on file     Relationship status: Not on file    Intimate partner violence     Fear of current or ex partner: Not on file     Emotionally abused: Not on file     Physically abused: Not on file     Forced sexual activity: Not on file   Other Topics Concern    Not on file   Social History Narrative    Not on file       REVIEW OF SYSTEMS: A 12-point review of systemswas obtained and pertinent positives and negatives were enumerated above in the history of present illness. All other reviewed systems / symptoms were negative. Review of Systems   Constitutional: Positive for appetite change and fatigue. Negative for unexpected weight change. HENT: Negative. Negative for dental problem, postnasal drip, sinus pressure, sore throat, trouble swallowing and voice change. Eyes: Negative. Negative for visual disturbance. Respiratory: Negative. Negative for cough, choking and wheezing. Cardiovascular: Negative. Negative for chest pain, palpitations and leg swelling. Gastrointestinal: Positive for abdominal distention and abdominal pain. Negative for anal bleeding, blood in stool, constipation, diarrhea, nausea, rectal pain and vomiting. Endocrine: Negative. Genitourinary: Negative. Negative for difficulty urinating. Musculoskeletal: Negative. Negative for arthralgias, back pain, gait problem and myalgias. Skin: Negative. Allergic/Immunologic: Negative. Negative for environmental allergies and food allergies.    Neurological: Negative. Negative for dizziness, weakness, light-headedness, numbness and headaches. Hematological: Does not bruise/bleed easily. Psychiatric/Behavioral: Positive for sleep disturbance. The patient is nervous/anxious. LABORATORY DATA: Reviewed  Lab Results   Component Value Date    WBC 8.2 08/21/2020    HGB 13.3 08/21/2020    HCT 39.8 08/21/2020    MCV 98.8 08/21/2020     08/21/2020     08/21/2020    K 4.4 08/21/2020     08/21/2020    CO2 24 08/21/2020    BUN 13 08/21/2020    CREATININE 0.84 08/21/2020    LABALBU 4.2 08/21/2020    BILITOT 0.30 08/21/2020    ALKPHOS 130 (H) 08/21/2020    AST 60 (H) 08/21/2020    ALT 79 (H) 08/21/2020         Lab Results   Component Value Date    RBC 4.03 08/21/2020    HGB 13.3 08/21/2020    MCV 98.8 08/21/2020    MCH 33.0 08/21/2020    MCHC 33.4 08/21/2020    RDW 13.6 08/21/2020    MPV 8.3 08/21/2020    BASOPCT 0 08/21/2020    LYMPHSABS 2.87 08/21/2020    MONOSABS 0.53 08/21/2020    NEUTROABS 4.67 08/21/2020    EOSABS <0.03 08/21/2020    BASOSABS <0.03 08/21/2020         DIAGNOSTIC TESTING:     Ct Abdomen Pelvis W Iv Contrast Additional Contrast? None    Result Date: 8/5/2020  EXAMINATION: CT OF THE ABDOMEN AND PELVIS WITH CONTRAST 8/5/2020 2:01 pm TECHNIQUE: CT of the abdomen and pelvis was performed with the administration of intravenous contrast. Multiplanar reformatted images are provided for review. Dose modulation, iterative reconstruction, and/or weight based adjustment of the mA/kV was utilized to reduce the radiation dose to as low as reasonably achievable. COMPARISON: None. HISTORY: ORDERING SYSTEM PROVIDED HISTORY: abdominal pain h/o hep c TECHNOLOGIST PROVIDED HISTORY: abdominal pain h/o hep c Reason for Exam: Pt c/o right-sided abdominal pain and bilateral leg swelling x1 week. Pt denies N/V and diarrhea.  Pt states she vomited once 2 days ago, and her stool this morning was darker than usual. Acuity: Acute Type of Exam: Initial Relevant Medical/Surgical History: Hx of hepatitis C, appendectomy FINDINGS: Lower Chest: Normal heart size. Lung bases show no significant abnormality. Left cardiophrenic and nodular density measuring 2.3 cm which measures fluid density most consistent with cyst.  Small right cardiophrenic angle nodular density possibly benign lymph node Organs: Borderline hepatomegaly with likely fatty infiltration. Spleen, pancreas, adrenal glands and kidneys appear unremarkable. Partially contracted gallbladder with slight wall thickening/enhancement. GI/Bowel: No evidence of bowel obstruction. Mild wall thickening/enhancement along the cecum and ascending colon with slight adjacent edema. Pelvis: Bladder and uterus appear unremarkable Peritoneum/Retroperitoneum: Normal caliber abdominal aorta. No suspicious lymphadenopathy. No ascites or free air. Bones/Soft Tissues: No significant osseous abnormality. 1. Mild wall thickening/enhancement along the cecum and ascending colon with slight adjacent edema suggesting colitis. 2. Slight wall thickening/enhancement of the gallbladder which may in part relate to partially contracted state. Consider further assessment with ultrasound as clinically warranted. 3. Borderline hepatomegaly with likely fatty infiltration which could also be further assessed with ultrasound. 4. Small benign cardiophrenic nodular densities as described. Us Gallbladder Ruq    Result Date: 8/21/2020  EXAMINATION: RIGHT UPPER QUADRANT ULTRASOUND 8/21/2020 10:16 am COMPARISON: August 5, 2020 HISTORY: ORDERING SYSTEM PROVIDED HISTORY: Pain TECHNOLOGIST PROVIDED HISTORY: Pain FINDINGS: LIVER:  The liver is enlarged with the right hepatic lobe measuring up to 19.5 cm in length. There is diffuse increased hepatic parenchymal echogenicity consistent with hepatic steatosis. No focal liver lesion. No intrahepatic biliary ductal dilatation. The portal vein demonstrates normal direction and flow.  BILIARY SYSTEM:  The gallbladder is contracted. No gallstones, gallbladder wall thickening or pericholecystic fluid. The sonographer reports negative Connell's sign upon transducer pressure over the gallbladder. Common bile duct is within normal limits measuring 4 mm. RIGHT KIDNEY: The right kidney is grossly unremarkable without evidence of hydronephrosis. PANCREAS:  Visualized portions of the pancreas are unremarkable. OTHER: No evidence of right upper quadrant ascites. Contracted gallbladder there is otherwise unremarkable. Moderate hepatomegaly probably related to hepatic steatosis. Us Gallbladder Ruq    Result Date: 8/5/2020  EXAMINATION: RIGHT UPPER QUADRANT ULTRASOUND 8/5/2020 2:59 pm COMPARISON: CT abdomen pelvis earlier same day HISTORY: ORDERING SYSTEM PROVIDED HISTORY: RUQ abdominal pain indeterminate CT FINDINGS: LIVER:  There is increased echogenicity of the liver suggesting fatty infiltration. No evidence of intrahepatic biliary ductal dilatation. Liver is mildly prominent measuring 20.1 cm. BILIARY SYSTEM:  Gallbladder is partially contracted but without evidence of pericholecystic fluid, significant wall thickening or stones. Negative sonographic Connell's sign. Common bile duct is within normal limits measuring 3 mm. RIGHT KIDNEY: The right kidney is grossly unremarkable without evidence of hydronephrosis. Right renal length is 11.2 cm. PANCREAS:  Visualized portions of the pancreas are unremarkable. OTHER: No evidence of right upper quadrant ascites. Redemonstration of mild hepatomegaly with fatty infiltration as well as partially contracted gallbladder. No sonographic evidence of cholelithiasis or cholecystitis. There were no vitals taken for this visit. PHYSICAL EXAMINATION: Vital signs reviewed per the nursing documentation. There is no height or weight on file to calculate BMI.    Physical Exam      I personally reviewed the nurse's notes and documentation and I agree with her notes. General: alert, appears stated age and cooperative Psych: Normal. and Alert and oriented, appropriate affect. . Normal affect. Mentation normal  HEENT: PERRLA. Clear conjunctivae and sclerae. Moist oral mucosae, no lesions or ulcers. The neck is supple, without lymphadenopathy or jugular venous distension. No masses. Normal thyroid. Cardiovascular: S1 S2 RRR no rubs or murmurs. Pulmonary: clear BL. No accessory muscle usage. Abdominal Exam: Soft, NT ND, no hepato or spleno megaly, +BS, no ascites. No groin masses or lymphadenopathy. Extremities: no lower ext edema. Skin: Warm skin. No skin rash. No spider nevi palmar erythema nail dystrophy. Joint: No joint swelling or deformity. Neurological: intact sensory. DTR+. IMPRESSION: Ms. Tracy Lacy is a 31 y.o. female with hepatitis C. We discussed work-up. We will obtain full liver serologies. We discussed natural history and treatment. We will obtain full labs. We will apply for treatment. Chronic abdominal pain. Very likely functional.  Trial of Levsin. Follow-up in 1 month. Spent 30 minutes providing patient education and counseling. Thank you for allowing me to participate in the care of Ms. Noble. For any further questions please do not hesitate to contact me. I have reviewed and agree with the MA/LPN ROS. Note is dictated utilizing voice recognition software. Unfortunately this leads to occasional typographical errors. Please contact our office if you have any questions.     Suyapa Polanco MD  [facility]  O: #[phone]

## 2020-09-14 ENCOUNTER — HOSPITAL ENCOUNTER (OUTPATIENT)
Dept: ULTRASOUND IMAGING | Age: 30
Discharge: HOME | End: 2020-09-16
Payer: COMMERCIAL

## 2020-09-14 ENCOUNTER — HOSPITAL ENCOUNTER (OUTPATIENT)
Age: 30
Discharge: HOME | End: 2020-09-14
Payer: COMMERCIAL

## 2020-09-14 LAB
ABSOLUTE EOS #: 0.08 K/UL (ref 0–0.44)
ABSOLUTE IMMATURE GRANULOCYTE: 0.04 K/UL (ref 0–0.3)
ABSOLUTE LYMPH #: 2.89 K/UL (ref 1.1–3.7)
ABSOLUTE MONO #: 0.65 K/UL (ref 0.1–1.2)
ALBUMIN SERPL-MCNC: 3.9 G/DL (ref 3.5–5.2)
ALBUMIN/GLOBULIN RATIO: 1.3 (ref 1–2.5)
ALP BLD-CCNC: 105 U/L (ref 35–104)
ALT SERPL-CCNC: 92 U/L (ref 5–33)
AMPHETAMINE SCREEN URINE: NEGATIVE
ANION GAP SERPL CALCULATED.3IONS-SCNC: 13 MMOL/L (ref 9–17)
AST SERPL-CCNC: 40 U/L
BARBITURATE SCREEN URINE: NEGATIVE
BASOPHILS # BLD: 0 % (ref 0–2)
BASOPHILS ABSOLUTE: <0.03 K/UL (ref 0–0.2)
BENZODIAZEPINE SCREEN, URINE: NEGATIVE
BILIRUB SERPL-MCNC: 0.25 MG/DL (ref 0.3–1.2)
BUN BLDV-MCNC: 10 MG/DL (ref 6–20)
BUN/CREAT BLD: ABNORMAL (ref 9–20)
BUPRENORPHINE URINE: NORMAL
CALCIUM SERPL-MCNC: 9.2 MG/DL (ref 8.6–10.4)
CANNABINOID SCREEN URINE: NEGATIVE
CERULOPLASMIN: 27 MG/DL (ref 16–45)
CHLORIDE BLD-SCNC: 105 MMOL/L (ref 98–107)
CO2: 19 MMOL/L (ref 20–31)
COCAINE METABOLITE, URINE: NEGATIVE
CREAT SERPL-MCNC: 0.79 MG/DL (ref 0.5–0.9)
DIFFERENTIAL TYPE: ABNORMAL
EOSINOPHILS RELATIVE PERCENT: 1 % (ref 1–4)
ETHANOL PERCENT: <0.01 %
ETHANOL: <10 MG/DL
GFR AFRICAN AMERICAN: >60 ML/MIN
GFR NON-AFRICAN AMERICAN: >60 ML/MIN
GFR SERPL CREATININE-BSD FRML MDRD: ABNORMAL ML/MIN/{1.73_M2}
GFR SERPL CREATININE-BSD FRML MDRD: ABNORMAL ML/MIN/{1.73_M2}
GLUCOSE BLD-MCNC: 105 MG/DL (ref 70–99)
HAV AB SERPL IA-ACNC: REACTIVE
HBV SURFACE AB TITR SER: 135.7 MIU/ML
HCT VFR BLD CALC: 36.3 % (ref 36.3–47.1)
HEMOGLOBIN: 11.9 G/DL (ref 11.9–15.1)
HEPATITIS B CORE TOTAL ANTIBODY: REACTIVE
HEPATITIS B SURFACE ANTIGEN: NONREACTIVE
HEPATITIS C ANTIBODY: REACTIVE
HIV AG/AB: NONREACTIVE
IGG: 1342 MG/DL (ref 700–1600)
IGM: 199 MG/DL (ref 40–230)
IMMATURE GRANULOCYTES: 1 %
LYMPHOCYTES # BLD: 36 % (ref 24–43)
MCH RBC QN AUTO: 33.2 PG (ref 25.2–33.5)
MCHC RBC AUTO-ENTMCNC: 32.8 G/DL (ref 28.4–34.8)
MCV RBC AUTO: 101.4 FL (ref 82.6–102.9)
MDMA URINE: NORMAL
METHADONE SCREEN, URINE: NEGATIVE
METHAMPHETAMINE, URINE: NORMAL
MONOCYTES # BLD: 8 % (ref 3–12)
NRBC AUTOMATED: 0 PER 100 WBC
OPIATES, URINE: NEGATIVE
OXYCODONE SCREEN URINE: NEGATIVE
PDW BLD-RTO: 13.2 % (ref 11.8–14.4)
PHENCYCLIDINE, URINE: NEGATIVE
PLATELET # BLD: 209 K/UL (ref 138–453)
PLATELET ESTIMATE: ABNORMAL
PMV BLD AUTO: 8.1 FL (ref 8.1–13.5)
POTASSIUM SERPL-SCNC: 4 MMOL/L (ref 3.7–5.3)
PROPOXYPHENE, URINE: NORMAL
RBC # BLD: 3.58 M/UL (ref 3.95–5.11)
RBC # BLD: ABNORMAL 10*6/UL
SEG NEUTROPHILS: 54 % (ref 36–65)
SEGMENTED NEUTROPHILS ABSOLUTE COUNT: 4.36 K/UL (ref 1.5–8.1)
SODIUM BLD-SCNC: 137 MMOL/L (ref 135–144)
TEST INFORMATION: NORMAL
TOTAL PROTEIN: 6.9 G/DL (ref 6.4–8.3)
TRICYCLIC ANTIDEPRESSANTS, UR: NORMAL
WBC # BLD: 8 K/UL (ref 3.5–11.3)
WBC # BLD: ABNORMAL 10*3/UL

## 2020-09-14 PROCEDURE — 82977 ASSAY OF GGT: CPT

## 2020-09-14 PROCEDURE — 80307 DRUG TEST PRSMV CHEM ANLYZR: CPT

## 2020-09-14 PROCEDURE — 82390 ASSAY OF CERULOPLASMIN: CPT

## 2020-09-14 PROCEDURE — 86704 HEP B CORE ANTIBODY TOTAL: CPT

## 2020-09-14 PROCEDURE — 87902 NFCT AGT GNTYP ALYS HEP C: CPT

## 2020-09-14 PROCEDURE — 86317 IMMUNOASSAY INFECTIOUS AGENT: CPT

## 2020-09-14 PROCEDURE — G0480 DRUG TEST DEF 1-7 CLASSES: HCPCS

## 2020-09-14 PROCEDURE — 86376 MICROSOMAL ANTIBODY EACH: CPT

## 2020-09-14 PROCEDURE — 84460 ALANINE AMINO (ALT) (SGPT): CPT

## 2020-09-14 PROCEDURE — 87340 HEPATITIS B SURFACE AG IA: CPT

## 2020-09-14 PROCEDURE — 80053 COMPREHEN METABOLIC PANEL: CPT

## 2020-09-14 PROCEDURE — 87522 HEPATITIS C REVRS TRNSCRPJ: CPT

## 2020-09-14 PROCEDURE — 86803 HEPATITIS C AB TEST: CPT

## 2020-09-14 PROCEDURE — 83516 IMMUNOASSAY NONANTIBODY: CPT

## 2020-09-14 PROCEDURE — 86038 ANTINUCLEAR ANTIBODIES: CPT

## 2020-09-14 PROCEDURE — 84520 ASSAY OF UREA NITROGEN: CPT

## 2020-09-14 PROCEDURE — 82104 ALPHA-1-ANTITRYPSIN PHENO: CPT

## 2020-09-14 PROCEDURE — 76705 ECHO EXAM OF ABDOMEN: CPT

## 2020-09-14 PROCEDURE — 82784 ASSAY IGA/IGD/IGG/IGM EACH: CPT

## 2020-09-14 PROCEDURE — 86256 FLUORESCENT ANTIBODY TITER: CPT

## 2020-09-14 PROCEDURE — 36415 COLL VENOUS BLD VENIPUNCTURE: CPT

## 2020-09-14 PROCEDURE — 82103 ALPHA-1-ANTITRYPSIN TOTAL: CPT

## 2020-09-14 PROCEDURE — 87389 HIV-1 AG W/HIV-1&-2 AB AG IA: CPT

## 2020-09-14 PROCEDURE — 86708 HEPATITIS A ANTIBODY: CPT

## 2020-09-14 PROCEDURE — 86255 FLUORESCENT ANTIBODY SCREEN: CPT

## 2020-09-14 PROCEDURE — 84450 TRANSFERASE (AST) (SGOT): CPT

## 2020-09-14 PROCEDURE — 85025 COMPLETE CBC W/AUTO DIFF WBC: CPT

## 2020-09-14 PROCEDURE — 83883 ASSAY NEPHELOMETRY NOT SPEC: CPT

## 2020-09-15 LAB
ANTI-NUCLEAR ANTIBODY (ANA): NEGATIVE
DIRECT EXAM: ABNORMAL
DIRECT EXAM: ABNORMAL
Lab: ABNORMAL
SMOOTH MUSCLE ANTIBODY: ABNORMAL
SPECIMEN DESCRIPTION: ABNORMAL

## 2020-09-16 LAB — LIVER-KIDNEY MICROSOMAL AB: NORMAL

## 2020-09-17 LAB
ALANINE AMINOTRANSFERASE, FIBROMETER: 103 U/L (ref 5–40)
ALPHA-1 ANTITRYPSIN PHENOTYPE: NORMAL
ALPHA-1 ANTITRYPSIN: 129 MG/DL (ref 90–200)
ALPHA-2-MACROGLOBULIN, FIBROMETER: 149 MG/DL (ref 131–293)
ASPARTATE AMINOTRANSFERASE, FIBROMETER: 39 U/L (ref 9–40)
CIRRHOMETER PATIENT SCORE: 0
EER FIBROMETER REPORT: ABNORMAL
FIBROMETER INTERPRETATION: ABNORMAL
FIBROMETER PATIENT SCORE: 0.05
FIBROMETER PLATELET COUNT: 201
FIBROMETER PROTHROMBIN INDEX: 124 % (ref 90–120)
FIBROSIS METAVIR CLASSIFICATION: ABNORMAL
GAMMA GLUTAMYL TRANSFERASE, FIBROMETER: 50 U/L (ref 7–33)
INFLAMETER METAVIR CLASSIFICATION: ABNORMAL
INFLAMETER PATIENT SCORE: 0.27
MITOCHONDRIAL ANTIBODY: 1.7 UNITS (ref 0–24.9)
UREA NITROGEN, FIBROMETER: 11 MG/DL (ref 7–20)

## 2020-09-18 LAB
HCV QUANTITATIVE: NORMAL
HEPATITIS C GENOTYPE: NORMAL

## 2020-09-21 ENCOUNTER — OFFICE VISIT (OUTPATIENT)
Dept: INTERNAL MEDICINE CLINIC | Age: 30
End: 2020-09-21
Payer: COMMERCIAL

## 2020-09-21 VITALS
WEIGHT: 195 LBS | DIASTOLIC BLOOD PRESSURE: 68 MMHG | TEMPERATURE: 98.1 F | BODY MASS INDEX: 33.29 KG/M2 | SYSTOLIC BLOOD PRESSURE: 110 MMHG | HEIGHT: 64 IN | HEART RATE: 90 BPM | OXYGEN SATURATION: 98 %

## 2020-09-21 PROBLEM — K52.9 COLITIS: Status: RESOLVED | Noted: 2020-08-25 | Resolved: 2020-09-21

## 2020-09-21 PROCEDURE — G8417 CALC BMI ABV UP PARAM F/U: HCPCS | Performed by: FAMILY MEDICINE

## 2020-09-21 PROCEDURE — 1036F TOBACCO NON-USER: CPT | Performed by: FAMILY MEDICINE

## 2020-09-21 PROCEDURE — G8427 DOCREV CUR MEDS BY ELIG CLIN: HCPCS | Performed by: FAMILY MEDICINE

## 2020-09-21 PROCEDURE — 99214 OFFICE O/P EST MOD 30 MIN: CPT | Performed by: FAMILY MEDICINE

## 2020-09-21 ASSESSMENT — ENCOUNTER SYMPTOMS
RESPIRATORY NEGATIVE: 1
GASTROINTESTINAL NEGATIVE: 1
ALLERGIC/IMMUNOLOGIC NEGATIVE: 1
EYES NEGATIVE: 1

## 2020-09-21 NOTE — PROGRESS NOTES
Subjective:      Patient ID: Ronit Doyle is a 31 y.o. female. 60-year-old pleasant  female who recently established with hospice presented for her first follow-up. History of IV drug abuse in remission with subsequent chronic hepatitis C with tachycardia, and elevated LFTs for which she was recently seen by GI  Patient stated that she is adherent to all the antipsychotics as provided by her med list as services      Review of Systems   Constitutional: Negative. HENT: Negative. Eyes: Negative. Respiratory: Negative. Cardiovascular: Negative. Gastrointestinal: Negative. Endocrine: Negative. Musculoskeletal: Negative. Skin: Negative. Allergic/Immunologic: Negative. Neurological: Negative. Hematological: Negative. Psychiatric/Behavioral: Positive for dysphoric mood. Past family and social history unremarkable. Diagnosis Orders   1. Attention deficit hyperactivity disorder (ADHD), other type     2. Borderline personality disorder in [facility])     3. Drug abuse, opioid type ([facility] [address]     4. Use of nicotine containing substance in combustion-free vaporization device     5. Elevated LFTs     6. Obesity (BMI 30.0-34.9)     7. Admitted to substance misuse detoxification center           Objective:   Physical Exam  Vitals signs and nursing note reviewed. Constitutional:       Appearance: She is well-developed. Comments: Obesity   HENT:      Head: Normocephalic and atraumatic. Right Ear: External ear normal.      Left Ear: External ear normal.   Eyes:      Conjunctiva/sclera: Conjunctivae normal.      Pupils: Pupils are equal, round, and reactive to light. Neck:      Musculoskeletal: Normal range of motion and neck supple. Cardiovascular:      Rate and Rhythm: Normal rate and regular rhythm. Heart sounds: Normal heart sounds. Pulmonary:      Effort: Pulmonary effort is normal.      Breath sounds: Normal breath sounds.    Abdominal:      General: Bowel sounds are normal.      Palpations: Abdomen is soft. Comments: Chronic hepatitis C without coma. Patient is established with GI   Genitourinary:     Vagina: Normal.   Musculoskeletal: Normal range of motion. Skin:     General: Skin is warm and dry. Neurological:      Mental Status: She is alert and oriented to person, place, and time. Deep Tendon Reflexes: Reflexes are normal and symmetric. Psychiatric:      Comments: Mood disorder. Polypharmacy antipsychotics as provided by psychiatry. She denies any recent decompensation. History of substance abuse including IV drugs in remission         Assessment:       Diagnosis Orders   1. Attention deficit hyperactivity disorder (ADHD), other type     2. Borderline personality disorder in adult [facility])     3. Drug abuse, opioid type ([address]     4. Use of nicotine containing substance in combustion-free vaporization device     5. Elevated LFTs     6. Obesity (BMI 30.0-34.9)     7. Admitted to substance misuse detoxification center             Plan: This is her first follow-up. 59-year-old  female with known history of mood disorder who is established with psychiatry. Patient is compliant with cariprazine, Wellbutrin, Lamictal, trazodone, vilazodone with positive response. She denies suicidality, delusions or hallucinations  History of drug abuse including IV drugs. She is in remission. Advised to follow closely with mental health services  History of inpatient care  History of chronic hepatitis C without coma. Underlying history of IV drug abuse. She was recently seen/established with GI pending further evaluation and treatment options  Elevated LFTs  Obesity.   She would benefit from low-fat high-fiber diet, daily moderate exercise, lifestyle change and weight reduction to keep BMI around 25  She denies ongoing history of tobacco, excessive alcohol or illicit drug use  Med list unavailable labs reviewed, discussed with patient, questions

## 2020-10-29 ENCOUNTER — TELEPHONE (OUTPATIENT)
Dept: GASTROENTEROLOGY | Age: 30
End: 2020-10-29

## 2020-10-29 NOTE — TELEPHONE ENCOUNTER
Attempted to contact patient to cancel and reschedule appointment she has scheduled with Dr. Melonie Ortiz on 10/30 due to family emergency. Unable to leave message after patient not answering because it says she has \"call restrictions\".

## 2020-11-10 ENCOUNTER — OFFICE VISIT (OUTPATIENT)
Dept: GASTROENTEROLOGY | Age: 30
End: 2020-11-10
Payer: COMMERCIAL

## 2020-11-10 VITALS
DIASTOLIC BLOOD PRESSURE: 85 MMHG | TEMPERATURE: 97.2 F | BODY MASS INDEX: 32.79 KG/M2 | SYSTOLIC BLOOD PRESSURE: 126 MMHG | WEIGHT: 191 LBS | HEART RATE: 98 BPM

## 2020-11-10 PROCEDURE — G8484 FLU IMMUNIZE NO ADMIN: HCPCS | Performed by: INTERNAL MEDICINE

## 2020-11-10 PROCEDURE — G8417 CALC BMI ABV UP PARAM F/U: HCPCS | Performed by: INTERNAL MEDICINE

## 2020-11-10 PROCEDURE — 1036F TOBACCO NON-USER: CPT | Performed by: INTERNAL MEDICINE

## 2020-11-10 PROCEDURE — G8427 DOCREV CUR MEDS BY ELIG CLIN: HCPCS | Performed by: INTERNAL MEDICINE

## 2020-11-10 PROCEDURE — 99213 OFFICE O/P EST LOW 20 MIN: CPT | Performed by: INTERNAL MEDICINE

## 2020-11-10 ASSESSMENT — ENCOUNTER SYMPTOMS
ANAL BLEEDING: 0
WHEEZING: 0
CHOKING: 0
SORE THROAT: 0
CONSTIPATION: 0
ABDOMINAL DISTENTION: 1
EYES NEGATIVE: 1
BLOOD IN STOOL: 0
ABDOMINAL PAIN: 1
RESPIRATORY NEGATIVE: 1
BACK PAIN: 0
RECTAL PAIN: 0
COUGH: 0
DIARRHEA: 0
ALLERGIC/IMMUNOLOGIC NEGATIVE: 1
TROUBLE SWALLOWING: 0
VOICE CHANGE: 0
VOMITING: 0
NAUSEA: 0
SINUS PRESSURE: 0

## 2020-11-10 NOTE — PROGRESS NOTES
My patient has been free from illicit substances, controlled drugs and alcohol for 12 months and will remain sober throughout treatment and thereafter. I have discussed the importance of abstaining from illicit substances and alcohol for the duration of treatment and thereafter. I have offered counseling and have provided referrals if she chooses to utilize them. My patient has been educated and understands the ways to prevent exposure and transmission of Hepatitis C disease. My patient does not have limited life expectancy of less than 12 months due to non-liver-related comorbid conditions. My patient has received the anticipated dosing plan for Hepatitis C medication and follow-up schedule and agrees to be compliant and will not miss any doses of the prescribed medications. My patient fully understands that she is responsible for making arrangements to obtain specified laboratory blood work every 4 weeks (HCV RNA), and 12 weeks post treatment. If other labs are requested, my patient understands that she is responsible for making arrangements to obtain those as well.     Pt will be prescribed Mavyret for 8 weeks

## 2020-11-10 NOTE — PROGRESS NOTES
GI CLINIC FOLLOW UP    INTERVAL HISTORY:   No referring provider defined for this encounter. Chief Complaint   Patient presents with    Abdominal Pain     Levsin helped, pain is gone and she doesn't take it any longer    Hepatitis C     Waiting for the application approval.            HISTORY OF PRESENT ILLNESS: Ms.Mallory Navi Yañez is a 31 y.o. female with HCV. She reports no drugs. No alcohol. No GI symptoms. She was having abdominal pain. She took Levsin. This helped. Pain resolved. She is planned to start Vivitrol. Past Medical,Family, and Social History reviewed and does not contribute to the patient presentingcondition. Patient's PMH/PSH,SH,PSYCH Hx, MEDs, ALLERGIES, and ROS were all reviewed and updated in the appropriate sections.     PAST MEDICAL HISTORY:  Past Medical History:   Diagnosis Date    Anxiety     Borderline personality disorder ([facility] [address]     Depression     Disease of blood and blood forming organ     Drug use     Hepatitis C     History of iron deficiency anemia     Liver disease        Past Surgical History:   Procedure Laterality Date    APPENDECTOMY      APPENDECTOMY Right        CURRENT MEDICATIONS:    Current Outpatient Medications:     hyoscyamine (ANASPAZ;LEVSIN) 125 MCG tablet, Take 1 tablet by mouth every 4 hours as needed for Cramping (Patient not taking: Reported on 9/21/2020), Disp: 180 tablet, Rfl: 3    cariprazine hcl (VRAYLAR) 1.5 MG capsule, Take 1.5 mg by mouth daily, Disp: , Rfl:     busPIRone (BUSPAR) 10 MG tablet, Take 5 mg by mouth 2 times daily, Disp: , Rfl:     buPROPion (WELLBUTRIN XL) 300 MG extended release tablet, 1 tablet in the morning, Disp: , Rfl:     vilazodone HCl (VILAZODONE HCL) 20 MG TABS, Take 20 mg by mouth daily, Disp: , Rfl:     guanFACINE (INTUNIV) 2 MG TB24 extended release tablet, Take 2 mg by mouth daily, Disp: , Rfl:     lamoTRIgine (LAMICTAL) 100 MG tablet, Take 50 mg by mouth 2 times daily , Disp: , Rfl:    traZODone (DESYREL) 100 MG tablet, Take 150 mg by mouth nightly , Disp: , Rfl:     ALLERGIES:   Allergies   Allergen Reactions    Aspirin Anaphylaxis     Swelling, difficulty breathing    Codeine Anaphylaxis     Swelling, difficulty breathing    Ibuprofen Nausea And Vomiting     Facial swelling, SOB       FAMILY HISTORY:       Problem Relation Age of Onset    No Known Problems Mother         no health problems    No Known Problems Father         no health problems          SOCIAL HISTORY:   Social History     Socioeconomic History    Marital status: Single     Spouse name: Not on file    Number of children: Not on file    Years of education: Not on file    Highest education level: Not on file   Occupational History    Not on file   Social Needs    Financial resource strain: Not hard at all   [employer] insecurity     Worry: Never true     Inability: Never true   [employer] needs     Medical: Not on file     Non-medical: Not on file   Tobacco Use    Smoking status: Former Smoker     Packs/day: 1.00     Types: Cigarettes    Smokeless tobacco: Never Used    Tobacco comment: 0.5 ppd   Substance and Sexual Activity    Alcohol use: No    Drug use: Yes     Types: IV, Opiates , Marijuana     Comment: IV/ heroin/fentanyl- 0.5 g/ day/ 5 months/ last used 1-28-20 @ noon; smoke 1 gram of marjuana/day; sober for 1 year, relapsed 1/2019; started use 5 yrs ago and a became a problem then; no current legal issues    Sexual activity: Yes     Partners: Female   Lifestyle    Physical activity     Days per week: Not on file     Minutes per session: Not on file    Stress: Not on file   Relationships    Social connections     Talks on phone: Not on file     Gets together: Not on file     Attends Pent[employer]alism service: Not on file     Active member of club or organization: Not on file     Attends meetings of clubs or organizations: Not on file     Relationship status: Not on file    Intimate partner violence     Fear of current or ex partner: Not on file     Emotionally abused: Not on file     Physically abused: Not on file     Forced sexual activity: Not on file   Other Topics Concern    Not on file   Social History Narrative    Not on file       REVIEW OF SYSTEMS: A 12-point review of systemswas obtained and pertinent positives and negatives were enumerated above in the history of present illness. All other reviewed systems / symptoms were negative. Review of Systems   Constitutional: Positive for appetite change and fatigue. Negative for unexpected weight change. HENT: Negative. Negative for dental problem, postnasal drip, sinus pressure, sore throat, trouble swallowing and voice change. Eyes: Negative. Negative for visual disturbance. Respiratory: Negative. Negative for cough, choking and wheezing. Cardiovascular: Negative. Negative for chest pain, palpitations and leg swelling. Gastrointestinal: Positive for abdominal distention and abdominal pain. Negative for anal bleeding, blood in stool, constipation, diarrhea, nausea, rectal pain and vomiting. Endocrine: Negative. Genitourinary: Negative. Negative for difficulty urinating. Musculoskeletal: Negative. Negative for arthralgias, back pain, gait problem and myalgias. Skin: Negative. Allergic/Immunologic: Negative. Negative for environmental allergies and food allergies. Neurological: Negative. Negative for dizziness, weakness, light-headedness, numbness and headaches. Hematological: Does not bruise/bleed easily. Psychiatric/Behavioral: Positive for sleep disturbance. The patient is nervous/anxious.             LABORATORY DATA: Reviewed  Lab Results   Component Value Date    WBC 8.0 09/14/2020    HGB 11.9 09/14/2020    HCT 36.3 09/14/2020    .4 09/14/2020     09/14/2020     09/14/2020    K 4.0 09/14/2020     09/14/2020    CO2 19 (L) 09/14/2020    BUN 10 09/14/2020    CREATININE 0.79 09/14/2020    LABALBU 3.9 09/14/2020    BILITOT 0.25 (L) 09/14/2020    ALKPHOS 105 (H) 09/14/2020    AST 40 (H) 09/14/2020    ALT 92 (H) 09/14/2020         Lab Results   Component Value Date    RBC 3.58 (L) 09/14/2020    HGB 11.9 09/14/2020    .4 09/14/2020    MCH 33.2 09/14/2020    MCHC 32.8 09/14/2020    RDW 13.2 09/14/2020    MPV 8.1 09/14/2020    BASOPCT 0 09/14/2020    LYMPHSABS 2.89 09/14/2020    MONOSABS 0.65 09/14/2020    NEUTROABS 4.36 09/14/2020    EOSABS 0.08 09/14/2020    BASOSABS <0.03 09/14/2020         DIAGNOSTIC TESTING:     No results found. PHYSICAL EXAMINATION: Vital signs reviewed per the nursing documentation. There were no vitals taken for this visit. There is no height or weight on file to calculate BMI. Physical Exam      I personally reviewed the nurse's notes and documentation and I agree with her notes. General: alert, appears stated age and cooperative Psych: Normal. and Alert and oriented, appropriate affect. . Normal affect. Mentation normal  HEENT: PERRLA. Clear conjunctivae and sclerae. Moist oral mucosae, no lesions or ulcers. The neck is supple, without lymphadenopathy or jugular venous distension. No masses. Normal thyroid. Cardiovascular: S1 S2 RRR no rubs or murmurs. Pulmonary: clear BL. No accessory muscle usage. Abdominal Exam: Soft, NT ND, no hepato or spleno megaly, +BS, no ascites. IMPRESSION: Ms. Medardo Harkins is a 31 y.o. female with chronic hepatitis C. Prior exposure to hepatitis A and B. We will apply for treatment. Follow-up in 3 months. Will monitor LFTs through treatment. Thank you for allowing me to participate in the care of Ms. Noble. For any further questions please do not hesitate to contact me. I have reviewed and agree with the ROS entered by the MA/LPN. Note is dictated utilizing voice recognition software. Unfortunately this leads to occasional typographical errors. Please contact our office if you have any questions.     Eileen Jim, MD  [facility]  O: #[phone]

## 2021-02-02 ENCOUNTER — TELEPHONE (OUTPATIENT)
Dept: GASTROENTEROLOGY | Age: 31
End: 2021-02-02

## 2021-02-02 NOTE — TELEPHONE ENCOUNTER
Pam Lane from [facility] has been unable to get a hold of Seb Cardenas to get her Medication delivered. I spoke with Seb Cardenas and let her know that they will be contacting them. Pam Lane states she has tried to call and gets nothing. Writer called and got no answer and Voicemail is full.    Writer called and left a message on Emergency contacts number Alejandra Barron that we need to gt a hold of brad to get her medication delivered and they needed to call Pam Lane at [phone]

## 2021-02-23 NOTE — TELEPHONE ENCOUNTER
Writer finally got a hold of brad and she is in rehad right now after a relapse. She is going to take time for herself and will check back in a few months  And we will reapply for that in a few months.

## 2021-03-17 ENCOUNTER — APPOINTMENT (OUTPATIENT)
Dept: GENERAL RADIOLOGY | Age: 31
DRG: 817 | End: 2021-03-17
Payer: COMMERCIAL

## 2021-03-17 ENCOUNTER — HOSPITAL ENCOUNTER (INPATIENT)
Age: 31
LOS: 2 days | Discharge: PSYCHIATRIC OR SUBSTANCE USE FACILITY | DRG: 817 | End: 2021-03-19
Attending: EMERGENCY MEDICINE | Admitting: INTERNAL MEDICINE
Payer: COMMERCIAL

## 2021-03-17 DIAGNOSIS — T43.211A OVERDOSE OF TRAZODONE: Primary | ICD-10-CM

## 2021-03-17 PROBLEM — J98.8 RESPIRATORY DECOMPENSATION: Status: ACTIVE | Noted: 2021-03-17

## 2021-03-17 LAB
-: ABNORMAL
ABSOLUTE EOS #: 0.28 K/UL (ref 0–0.44)
ABSOLUTE IMMATURE GRANULOCYTE: 0.03 K/UL (ref 0–0.3)
ABSOLUTE LYMPH #: 2.56 K/UL (ref 1.1–3.7)
ABSOLUTE MONO #: 0.48 K/UL (ref 0.1–1.2)
ACETAMINOPHEN LEVEL: <5 UG/ML (ref 10–30)
ACETAMINOPHEN LEVEL: <5 UG/ML (ref 10–30)
ALBUMIN SERPL-MCNC: 4.3 G/DL (ref 3.5–5.2)
ALBUMIN/GLOBULIN RATIO: 1.2 (ref 1–2.5)
ALLEN TEST: ABNORMAL
ALLEN TEST: POSITIVE
ALP BLD-CCNC: 109 U/L (ref 35–104)
ALT SERPL-CCNC: 49 U/L (ref 5–33)
AMORPHOUS: ABNORMAL
AMPHETAMINE SCREEN URINE: NEGATIVE
ANION GAP SERPL CALCULATED.3IONS-SCNC: 20 MMOL/L (ref 9–17)
AST SERPL-CCNC: 33 U/L
BACTERIA: ABNORMAL
BARBITURATE SCREEN URINE: NEGATIVE
BASOPHILS # BLD: 1 % (ref 0–2)
BASOPHILS ABSOLUTE: 0.05 K/UL (ref 0–0.2)
BENZODIAZEPINE SCREEN, URINE: POSITIVE
BILIRUB SERPL-MCNC: 0.64 MG/DL (ref 0.3–1.2)
BILIRUBIN URINE: NEGATIVE
BNP INTERPRETATION: NORMAL
BUN BLDV-MCNC: 8 MG/DL (ref 6–20)
BUN/CREAT BLD: ABNORMAL (ref 9–20)
BUPRENORPHINE URINE: ABNORMAL
CALCIUM SERPL-MCNC: 9.6 MG/DL (ref 8.6–10.4)
CANNABINOID SCREEN URINE: NEGATIVE
CARBOXYHEMOGLOBIN: 4.5 % (ref 0–5)
CASTS UA: ABNORMAL /LPF (ref 0–2)
CASTS UA: ABNORMAL /LPF (ref 0–2)
CHLORIDE BLD-SCNC: 98 MMOL/L (ref 98–107)
CO2: 18 MMOL/L (ref 20–31)
COCAINE METABOLITE, URINE: POSITIVE
COLOR: YELLOW
COMMENT UA: ABNORMAL
CREAT SERPL-MCNC: 1 MG/DL (ref 0.5–0.9)
CRYSTALS, UA: ABNORMAL /HPF
DIFFERENTIAL TYPE: NORMAL
EKG ATRIAL RATE: 123 BPM
EKG P AXIS: 66 DEGREES
EKG P-R INTERVAL: 132 MS
EKG Q-T INTERVAL: 418 MS
EKG QRS DURATION: 84 MS
EKG QTC CALCULATION (BAZETT): 598 MS
EKG R AXIS: 63 DEGREES
EKG T AXIS: 65 DEGREES
EKG VENTRICULAR RATE: 123 BPM
EOSINOPHILS RELATIVE PERCENT: 4 % (ref 1–4)
EPITHELIAL CELLS UA: ABNORMAL /HPF (ref 0–5)
ETHANOL PERCENT: <0.01 %
ETHANOL PERCENT: <0.01 %
ETHANOL: <10 MG/DL
ETHANOL: <10 MG/DL
FIO2: 80
FIO2: ABNORMAL
GFR AFRICAN AMERICAN: >60 ML/MIN
GFR NON-AFRICAN AMERICAN: >60 ML/MIN
GFR SERPL CREATININE-BSD FRML MDRD: ABNORMAL ML/MIN/{1.73_M2}
GFR SERPL CREATININE-BSD FRML MDRD: ABNORMAL ML/MIN/{1.73_M2}
GLUCOSE BLD-MCNC: 132 MG/DL (ref 65–105)
GLUCOSE BLD-MCNC: 163 MG/DL (ref 70–99)
GLUCOSE URINE: NEGATIVE
HCG QUALITATIVE: NEGATIVE
HCG(URINE) PREGNANCY TEST: NEGATIVE
HCO3 VENOUS: 20.6 MMOL/L (ref 24–30)
HCT VFR BLD CALC: 43.7 % (ref 36.3–47.1)
HEMOGLOBIN: 14.4 G/DL (ref 11.9–15.1)
IMMATURE GRANULOCYTES: 0 %
INR BLD: 1
KETONES, URINE: NEGATIVE
LEUKOCYTE ESTERASE, URINE: NEGATIVE
LIPASE: 15 U/L (ref 13–60)
LYMPHOCYTES # BLD: 32 % (ref 24–43)
MAGNESIUM: 2.1 MG/DL (ref 1.6–2.6)
MCH RBC QN AUTO: 33 PG (ref 25.2–33.5)
MCHC RBC AUTO-ENTMCNC: 33 G/DL (ref 28.4–34.8)
MCV RBC AUTO: 100.2 FL (ref 82.6–102.9)
MDMA URINE: ABNORMAL
METHADONE SCREEN, URINE: NEGATIVE
METHAMPHETAMINE, URINE: ABNORMAL
METHEMOGLOBIN: ABNORMAL % (ref 0–1.5)
MODE: ABNORMAL
MODE: ABNORMAL
MONOCYTES # BLD: 6 % (ref 3–12)
MRSA, DNA, NASAL: NORMAL
MUCUS: ABNORMAL
NEGATIVE BASE EXCESS, ART: 1 (ref 0–2)
NEGATIVE BASE EXCESS, VEN: 5.8 MMOL/L (ref 0–2)
NITRITE, URINE: NEGATIVE
NOTIFICATION TIME: ABNORMAL
NOTIFICATION: ABNORMAL
NRBC AUTOMATED: 0 PER 100 WBC
O2 DEVICE/FLOW/%: ABNORMAL
O2 DEVICE/FLOW/%: ABNORMAL
O2 SAT, VEN: 73 % (ref 60–85)
OPIATES, URINE: NEGATIVE
OTHER OBSERVATIONS UA: ABNORMAL
OXYCODONE SCREEN URINE: NEGATIVE
OXYHEMOGLOBIN: ABNORMAL % (ref 95–98)
PARTIAL THROMBOPLASTIN TIME: 18.7 SEC (ref 20.5–30.5)
PATIENT TEMP: 37
PATIENT TEMP: ABNORMAL
PCO2, VEN, TEMP ADJ: ABNORMAL MMHG (ref 39–55)
PCO2, VEN: 45.3 (ref 39–55)
PDW BLD-RTO: 12.5 % (ref 11.8–14.4)
PEEP/CPAP: ABNORMAL
PH UA: 5 (ref 5–8)
PH VENOUS: 7.28 (ref 7.32–7.42)
PH, VEN, TEMP ADJ: ABNORMAL (ref 7.32–7.42)
PHENCYCLIDINE, URINE: NEGATIVE
PLATELET # BLD: 188 K/UL (ref 138–453)
PLATELET ESTIMATE: NORMAL
PMV BLD AUTO: 8.8 FL (ref 8.1–13.5)
PO2, VEN, TEMP ADJ: ABNORMAL MMHG (ref 30–50)
PO2, VEN: 43.7 (ref 30–50)
POC HCO3: 24.6 MMOL/L (ref 21–28)
POC O2 SATURATION: 100 % (ref 94–98)
POC PCO2 TEMP: ABNORMAL MM HG
POC PCO2: 42 MM HG (ref 35–48)
POC PH TEMP: ABNORMAL
POC PH: 7.38 (ref 7.35–7.45)
POC PO2 TEMP: ABNORMAL MM HG
POC PO2: 440.9 MM HG (ref 83–108)
POSITIVE BASE EXCESS, ART: ABNORMAL (ref 0–3)
POSITIVE BASE EXCESS, VEN: ABNORMAL MMOL/L (ref 0–2)
POTASSIUM SERPL-SCNC: 3.5 MMOL/L (ref 3.7–5.3)
PRO-BNP: 38 PG/ML
PROPOXYPHENE, URINE: ABNORMAL
PROTEIN UA: ABNORMAL
PROTHROMBIN TIME: 10.4 SEC (ref 9.1–12.3)
PSV: ABNORMAL
PT. POSITION: ABNORMAL
RBC # BLD: 4.36 M/UL (ref 3.95–5.11)
RBC # BLD: NORMAL 10*6/UL
RBC UA: ABNORMAL /HPF (ref 0–2)
RENAL EPITHELIAL, UA: ABNORMAL /HPF
RESPIRATORY RATE: ABNORMAL
SALICYLATE LEVEL: <1 MG/DL (ref 3–10)
SALICYLATE LEVEL: <1 MG/DL (ref 3–10)
SAMPLE SITE: ABNORMAL
SAMPLE SITE: ABNORMAL
SARS-COV-2, RAPID: NOT DETECTED
SEG NEUTROPHILS: 57 % (ref 36–65)
SEGMENTED NEUTROPHILS ABSOLUTE COUNT: 4.57 K/UL (ref 1.5–8.1)
SET RATE: ABNORMAL
SODIUM BLD-SCNC: 136 MMOL/L (ref 135–144)
SPECIFIC GRAVITY UA: 1.01 (ref 1–1.03)
SPECIMEN DESCRIPTION: NORMAL
SPECIMEN DESCRIPTION: NORMAL
TCO2 (CALC), ART: 26 MMOL/L (ref 22–29)
TEST INFORMATION: ABNORMAL
TEXT FOR RESPIRATORY: ABNORMAL
TOTAL CK: 73 U/L (ref 26–192)
TOTAL HB: ABNORMAL G/DL (ref 12–16)
TOTAL PROTEIN: 8 G/DL (ref 6.4–8.3)
TOTAL RATE: ABNORMAL
TOXIC TRICYCLIC SC,BLOOD: NEGATIVE
TOXIC TRICYCLIC SC,BLOOD: NEGATIVE
TRICHOMONAS: ABNORMAL
TRICYCLIC ANTIDEPRESSANTS, UR: ABNORMAL
TROPONIN INTERP: NORMAL
TROPONIN T: NORMAL NG/ML
TROPONIN, HIGH SENSITIVITY: <6 NG/L (ref 0–14)
TURBIDITY: ABNORMAL
URINE HGB: ABNORMAL
UROBILINOGEN, URINE: NORMAL
VT: ABNORMAL
WBC # BLD: 8 K/UL (ref 3.5–11.3)
WBC # BLD: NORMAL 10*3/UL
WBC UA: ABNORMAL /HPF (ref 0–5)
YEAST: ABNORMAL

## 2021-03-17 PROCEDURE — 93010 ELECTROCARDIOGRAM REPORT: CPT | Performed by: INTERNAL MEDICINE

## 2021-03-17 PROCEDURE — 93005 ELECTROCARDIOGRAM TRACING: CPT | Performed by: STUDENT IN AN ORGANIZED HEALTH CARE EDUCATION/TRAINING PROGRAM

## 2021-03-17 PROCEDURE — 84703 CHORIONIC GONADOTROPIN ASSAY: CPT

## 2021-03-17 PROCEDURE — 94002 VENT MGMT INPAT INIT DAY: CPT

## 2021-03-17 PROCEDURE — 82803 BLOOD GASES ANY COMBINATION: CPT

## 2021-03-17 PROCEDURE — G0480 DRUG TEST DEF 1-7 CLASSES: HCPCS

## 2021-03-17 PROCEDURE — 6360000002 HC RX W HCPCS: Performed by: STUDENT IN AN ORGANIZED HEALTH CARE EDUCATION/TRAINING PROGRAM

## 2021-03-17 PROCEDURE — 2580000003 HC RX 258: Performed by: STUDENT IN AN ORGANIZED HEALTH CARE EDUCATION/TRAINING PROGRAM

## 2021-03-17 PROCEDURE — 87641 MR-STAPH DNA AMP PROBE: CPT

## 2021-03-17 PROCEDURE — 2500000003 HC RX 250 WO HCPCS

## 2021-03-17 PROCEDURE — 99285 EMERGENCY DEPT VISIT HI MDM: CPT

## 2021-03-17 PROCEDURE — 6360000002 HC RX W HCPCS: Performed by: EMERGENCY MEDICINE

## 2021-03-17 PROCEDURE — 80143 DRUG ASSAY ACETAMINOPHEN: CPT

## 2021-03-17 PROCEDURE — 84484 ASSAY OF TROPONIN QUANT: CPT

## 2021-03-17 PROCEDURE — 83690 ASSAY OF LIPASE: CPT

## 2021-03-17 PROCEDURE — 02HV33Z INSERTION OF INFUSION DEVICE INTO SUPERIOR VENA CAVA, PERCUTANEOUS APPROACH: ICD-10-PCS | Performed by: EMERGENCY MEDICINE

## 2021-03-17 PROCEDURE — 82947 ASSAY GLUCOSE BLOOD QUANT: CPT

## 2021-03-17 PROCEDURE — 5A1945Z RESPIRATORY VENTILATION, 24-96 CONSECUTIVE HOURS: ICD-10-PCS | Performed by: INTERNAL MEDICINE

## 2021-03-17 PROCEDURE — 71045 X-RAY EXAM CHEST 1 VIEW: CPT

## 2021-03-17 PROCEDURE — 99291 CRITICAL CARE FIRST HOUR: CPT | Performed by: INTERNAL MEDICINE

## 2021-03-17 PROCEDURE — 82805 BLOOD GASES W/O2 SATURATION: CPT

## 2021-03-17 PROCEDURE — 85730 THROMBOPLASTIN TIME PARTIAL: CPT

## 2021-03-17 PROCEDURE — 2000000000 HC ICU R&B

## 2021-03-17 PROCEDURE — 87086 URINE CULTURE/COLONY COUNT: CPT

## 2021-03-17 PROCEDURE — 6360000002 HC RX W HCPCS

## 2021-03-17 PROCEDURE — 80307 DRUG TEST PRSMV CHEM ANLYZR: CPT

## 2021-03-17 PROCEDURE — 94761 N-INVAS EAR/PLS OXIMETRY MLT: CPT

## 2021-03-17 PROCEDURE — 80053 COMPREHEN METABOLIC PANEL: CPT

## 2021-03-17 PROCEDURE — 0BH18EZ INSERTION OF ENDOTRACHEAL AIRWAY INTO TRACHEA, VIA NATURAL OR ARTIFICIAL OPENING ENDOSCOPIC: ICD-10-PCS | Performed by: EMERGENCY MEDICINE

## 2021-03-17 PROCEDURE — 83735 ASSAY OF MAGNESIUM: CPT

## 2021-03-17 PROCEDURE — 82550 ASSAY OF CK (CPK): CPT

## 2021-03-17 PROCEDURE — 2700000000 HC OXYGEN THERAPY PER DAY

## 2021-03-17 PROCEDURE — 6370000000 HC RX 637 (ALT 250 FOR IP): Performed by: STUDENT IN AN ORGANIZED HEALTH CARE EDUCATION/TRAINING PROGRAM

## 2021-03-17 PROCEDURE — 36600 WITHDRAWAL OF ARTERIAL BLOOD: CPT

## 2021-03-17 PROCEDURE — 81025 URINE PREGNANCY TEST: CPT

## 2021-03-17 PROCEDURE — 36415 COLL VENOUS BLD VENIPUNCTURE: CPT

## 2021-03-17 PROCEDURE — 85025 COMPLETE CBC W/AUTO DIFF WBC: CPT

## 2021-03-17 PROCEDURE — 83880 ASSAY OF NATRIURETIC PEPTIDE: CPT

## 2021-03-17 PROCEDURE — 36556 INSERT NON-TUNNEL CV CATH: CPT

## 2021-03-17 PROCEDURE — 93005 ELECTROCARDIOGRAM TRACING: CPT | Performed by: INTERNAL MEDICINE

## 2021-03-17 PROCEDURE — 80179 DRUG ASSAY SALICYLATE: CPT

## 2021-03-17 PROCEDURE — 85610 PROTHROMBIN TIME: CPT

## 2021-03-17 PROCEDURE — 81001 URINALYSIS AUTO W/SCOPE: CPT

## 2021-03-17 PROCEDURE — U0002 COVID-19 LAB TEST NON-CDC: HCPCS

## 2021-03-17 RX ORDER — FAMOTIDINE 20 MG/1
20 TABLET, FILM COATED ORAL 2 TIMES DAILY
Status: DISCONTINUED | OUTPATIENT
Start: 2021-03-17 | End: 2021-03-19

## 2021-03-17 RX ORDER — 0.9 % SODIUM CHLORIDE 0.9 %
1000 INTRAVENOUS SOLUTION INTRAVENOUS ONCE
Status: COMPLETED | OUTPATIENT
Start: 2021-03-17 | End: 2021-03-17

## 2021-03-17 RX ORDER — PROMETHAZINE HYDROCHLORIDE 12.5 MG/1
12.5 TABLET ORAL EVERY 6 HOURS PRN
Status: DISCONTINUED | OUTPATIENT
Start: 2021-03-17 | End: 2021-03-19

## 2021-03-17 RX ORDER — PROPOFOL 10 MG/ML
INJECTION, EMULSION INTRAVENOUS
Status: COMPLETED
Start: 2021-03-17 | End: 2021-03-17

## 2021-03-17 RX ORDER — ACETAMINOPHEN 650 MG/1
650 SUPPOSITORY RECTAL EVERY 6 HOURS PRN
Status: DISCONTINUED | OUTPATIENT
Start: 2021-03-17 | End: 2021-03-19 | Stop reason: HOSPADM

## 2021-03-17 RX ORDER — PROPOFOL 10 MG/ML
10-50 INJECTION, EMULSION INTRAVENOUS
Status: DISCONTINUED | OUTPATIENT
Start: 2021-03-17 | End: 2021-03-18

## 2021-03-17 RX ORDER — NALOXONE HYDROCHLORIDE 1 MG/ML
INJECTION INTRAMUSCULAR; INTRAVENOUS; SUBCUTANEOUS
Status: COMPLETED
Start: 2021-03-17 | End: 2021-03-17

## 2021-03-17 RX ORDER — SODIUM CHLORIDE, SODIUM LACTATE, POTASSIUM CHLORIDE, AND CALCIUM CHLORIDE .6; .31; .03; .02 G/100ML; G/100ML; G/100ML; G/100ML
30 INJECTION, SOLUTION INTRAVENOUS ONCE
Status: DISCONTINUED | OUTPATIENT
Start: 2021-03-17 | End: 2021-03-17

## 2021-03-17 RX ORDER — ONDANSETRON 2 MG/ML
4 INJECTION INTRAMUSCULAR; INTRAVENOUS EVERY 6 HOURS PRN
Status: DISCONTINUED | OUTPATIENT
Start: 2021-03-17 | End: 2021-03-19

## 2021-03-17 RX ORDER — FENTANYL CITRATE 50 UG/ML
100 INJECTION, SOLUTION INTRAMUSCULAR; INTRAVENOUS ONCE
Status: COMPLETED | OUTPATIENT
Start: 2021-03-17 | End: 2021-03-17

## 2021-03-17 RX ORDER — SODIUM CHLORIDE 9 MG/ML
INJECTION, SOLUTION INTRAVENOUS CONTINUOUS
Status: DISCONTINUED | OUTPATIENT
Start: 2021-03-17 | End: 2021-03-18

## 2021-03-17 RX ORDER — PROPOFOL 10 MG/ML
10 INJECTION, EMULSION INTRAVENOUS
Status: DISCONTINUED | OUTPATIENT
Start: 2021-03-17 | End: 2021-03-17

## 2021-03-17 RX ORDER — NALOXONE HYDROCHLORIDE 1 MG/ML
2 INJECTION INTRAMUSCULAR; INTRAVENOUS; SUBCUTANEOUS PRN
Status: DISCONTINUED | OUTPATIENT
Start: 2021-03-17 | End: 2021-03-19 | Stop reason: HOSPADM

## 2021-03-17 RX ORDER — ACETAMINOPHEN 325 MG/1
650 TABLET ORAL EVERY 6 HOURS PRN
Status: DISCONTINUED | OUTPATIENT
Start: 2021-03-17 | End: 2021-03-19 | Stop reason: HOSPADM

## 2021-03-17 RX ORDER — ONDANSETRON 2 MG/ML
4 INJECTION INTRAMUSCULAR; INTRAVENOUS ONCE
Status: COMPLETED | OUTPATIENT
Start: 2021-03-17 | End: 2021-03-17

## 2021-03-17 RX ORDER — MAGNESIUM SULFATE IN WATER 40 MG/ML
2000 INJECTION, SOLUTION INTRAVENOUS ONCE
Status: COMPLETED | OUTPATIENT
Start: 2021-03-17 | End: 2021-03-17

## 2021-03-17 RX ORDER — POLYETHYLENE GLYCOL 3350 17 G/17G
17 POWDER, FOR SOLUTION ORAL DAILY PRN
Status: DISCONTINUED | OUTPATIENT
Start: 2021-03-17 | End: 2021-03-19 | Stop reason: HOSPADM

## 2021-03-17 RX ORDER — FENTANYL CITRATE 50 UG/ML
50 INJECTION, SOLUTION INTRAMUSCULAR; INTRAVENOUS
Status: DISCONTINUED | OUTPATIENT
Start: 2021-03-17 | End: 2021-03-19

## 2021-03-17 RX ORDER — SODIUM CHLORIDE 0.9 % (FLUSH) 0.9 %
10 SYRINGE (ML) INJECTION EVERY 12 HOURS SCHEDULED
Status: DISCONTINUED | OUTPATIENT
Start: 2021-03-17 | End: 2021-03-19 | Stop reason: HOSPADM

## 2021-03-17 RX ORDER — ALBUTEROL SULFATE 2.5 MG/3ML
2.5 SOLUTION RESPIRATORY (INHALATION)
Status: DISCONTINUED | OUTPATIENT
Start: 2021-03-17 | End: 2021-03-19 | Stop reason: HOSPADM

## 2021-03-17 RX ORDER — SODIUM CHLORIDE 0.9 % (FLUSH) 0.9 %
10 SYRINGE (ML) INJECTION PRN
Status: DISCONTINUED | OUTPATIENT
Start: 2021-03-17 | End: 2021-03-19 | Stop reason: HOSPADM

## 2021-03-17 RX ADMIN — SODIUM CHLORIDE, PRESERVATIVE FREE 10 ML: 5 INJECTION INTRAVENOUS at 09:03

## 2021-03-17 RX ADMIN — NALOXONE HYDROCHLORIDE 2 MG: 1 INJECTION INTRAMUSCULAR; INTRAVENOUS; SUBCUTANEOUS at 05:20

## 2021-03-17 RX ADMIN — SODIUM CHLORIDE: 9 INJECTION, SOLUTION INTRAVENOUS at 09:02

## 2021-03-17 RX ADMIN — ONDANSETRON 4 MG: 2 INJECTION INTRAMUSCULAR; INTRAVENOUS at 07:54

## 2021-03-17 RX ADMIN — PROPOFOL 20 MCG/KG/MIN: 10 INJECTION, EMULSION INTRAVENOUS at 05:45

## 2021-03-17 RX ADMIN — FENTANYL CITRATE 100 MCG: 50 INJECTION, SOLUTION INTRAMUSCULAR; INTRAVENOUS at 07:54

## 2021-03-17 RX ADMIN — SODIUM CHLORIDE, PRESERVATIVE FREE 10 ML: 5 INJECTION INTRAVENOUS at 21:12

## 2021-03-17 RX ADMIN — Medication 100 MG: at 05:36

## 2021-03-17 RX ADMIN — PROPOFOL 15 MCG/KG/MIN: 10 INJECTION, EMULSION INTRAVENOUS at 21:11

## 2021-03-17 RX ADMIN — PROPOFOL 35 MCG/KG/MIN: 10 INJECTION, EMULSION INTRAVENOUS at 09:26

## 2021-03-17 RX ADMIN — FAMOTIDINE 20 MG: 20 TABLET, FILM COATED ORAL at 21:12

## 2021-03-17 RX ADMIN — NALOXONE HYDROCHLORIDE 2 MG: 1 INJECTION PARENTERAL at 05:20

## 2021-03-17 RX ADMIN — MAGNESIUM SULFATE 2000 MG: 2 INJECTION INTRAVENOUS at 06:59

## 2021-03-17 RX ADMIN — ACETAMINOPHEN 650 MG: 325 TABLET ORAL at 23:33

## 2021-03-17 RX ADMIN — MAGNESIUM SULFATE 2000 MG: 2 INJECTION INTRAVENOUS at 11:24

## 2021-03-17 RX ADMIN — SODIUM CHLORIDE 1000 ML: 9 INJECTION, SOLUTION INTRAVENOUS at 05:45

## 2021-03-17 RX ADMIN — ENOXAPARIN SODIUM 40 MG: 40 INJECTION SUBCUTANEOUS at 10:03

## 2021-03-17 RX ADMIN — ACETAMINOPHEN 650 MG: 325 TABLET ORAL at 15:38

## 2021-03-17 ASSESSMENT — PAIN SCALES - GENERAL: PAINLEVEL_OUTOF10: 10

## 2021-03-17 ASSESSMENT — PULMONARY FUNCTION TESTS
PIF_VALUE: 16
PIF_VALUE: 14
PIF_VALUE: 15
PIF_VALUE: 15
PIF_VALUE: 14
PIF_VALUE: 15
PIF_VALUE: 15

## 2021-03-17 NOTE — PROGRESS NOTES
Attempted to call Emergency Contact listed Keiry Mosquerat) x2 with no answer. Will attempt at a later time.

## 2021-03-17 NOTE — PLAN OF CARE
Problem: OXYGENATION/RESPIRATORY FUNCTION  Goal: Patient will maintain patent airway  3/17/2021 0911 by Jun Zambrano RCP  Outcome: Ongoing  3/17/2021 0601 by Sp Vargas RCP  Outcome: Ongoing  Goal: Patient will achieve/maintain normal respiratory rate/effort  Description: Respiratory rate and effort will be within normal limits for the patient  3/17/2021 0911 by Jun Zambrano RCP  Outcome: Ongoing  3/17/2021 0601 by Sp Vargas RCP  Outcome: Ongoing     Problem: MECHANICAL VENTILATION  Goal: Patient will maintain patent airway  3/17/2021 0911 by Jun Zambrano RCP  Outcome: Ongoing  3/17/2021 0601 by Sp Vargas RCP  Outcome: Ongoing  Goal: Oral health is maintained or improved  3/17/2021 0911 by Jun Zambrano RCP  Outcome: Ongoing  3/17/2021 0601 by Sp Vargas RCP  Outcome: Ongoing  Goal: ET tube will be managed safely  3/17/2021 0911 by Jun Zambrano RCP  Outcome: Ongoing  3/17/2021 0601 by Sp Vargas RCP  Outcome: Ongoing  Goal: Ability to express needs and understand communication  3/17/2021 0911 by Jun Zambrano RCP  Outcome: Ongoing  3/17/2021 0601 by Sp Vargas RCP  Outcome: Ongoing  Goal: Mobility/activity is maintained at optimum level for patient  3/17/2021 0911 by Jun Zambrano RCP  Outcome: Ongoing  3/17/2021 0601 by Sp Vargas RCP  Outcome: Ongoing     Problem: SKIN INTEGRITY  Goal: Skin integrity is maintained or improved  3/17/2021 0911 by Jun Zambrano RCP  Outcome: Ongoing  3/17/2021 0601 by Sp Vargas RCP  Outcome: Ongoing

## 2021-03-17 NOTE — H&P
Critical Care - History and Physical Examination    Patient's name:  Abdulaziz Brody  Medical Record Number: [id number]  Patient's account/billing number: [de-identified]  Patient's YOB: 1990  Age: 31 y.o. Date of Admission: 3/17/2021  5:13 AM  Date of History and Physical Examination: 3/17/2021      Primary Care Physician: Aleyda Arroyo MD  Attending Physician:    Code Status: No Order    Chief complaint: Respiratory failure , overdose    HISTORY OF PRESENT ILLNESS:   History was obtained from chart review. Abdulaziz Brody is a 31 y.o. history of ADD, drug abuse, borderline personality presented to the emergency department in altered state secondary to trazodone overdose, took about 6 g of trazodone total.  Patient also has a history of hep C, according to emergency department staff patient was altered on presentation did not complain of chest pain or shortness of breath but was lethargic and unable to protect airway. Was intubated secondary to airway protection. On arrival patient was propofol sedation, EKG showing prolonged QRS, poison control was contacted and it was stated by poison control that trazodone toxicity includes respiratory depression and QRS prolongation. Patient was given 2 g of mag in the emergency department. Chest x-ray unremarkable, left IJ central line placed patient will be transferred to the medical ICU. Tylenol level negative, patient does have a metabolic acidosis on ABG. Past Medical History:        Diagnosis Date    Anxiety     Borderline personality disorder ([facility] [address]     Depression     Disease of blood and blood forming organ     Drug use     Hepatitis C     History of iron deficiency anemia     Liver disease        Past Surgical History:        Procedure Laterality Date    APPENDECTOMY      APPENDECTOMY Right        Allergies:     Allergies   Allergen Reactions    Aspirin Anaphylaxis     Swelling, difficulty breathing    Codeine Anaphylaxis Exam:    Vitals: /67   Pulse 98   Temp 99.5 °F (37.5 °C) (Bladder)   Resp 18   Ht 5' 4\" (1.626 m)   Wt 191 lb (86.6 kg)   SpO2 100%   BMI 32.79 kg/m²     Last Body weight:   Wt Readings from Last 3 Encounters:   03/17/21 191 lb (86.6 kg)   11/10/20 191 lb (86.6 kg)   09/21/20 195 lb (88.5 kg)       Body Mass Index : Body mass index is 32.79 kg/m². PHYSICAL EXAMINATION :  Constitutional: Intubated sedated  EENT: PERRLA, 2 mm bilaterally reactive  Neck: Supple, symmetrical, trachea midline, no adenopathy, thyroid symmetric, no jvd skin normal  Respiratory: clear to auscultation, no wheezes or rales and unlabored breathing. No intercostal tenderness  Cardiovascular: regular rate and rhythm, normal S1, S2, no murmur noted and 2+ pulses throughout  Abdomen: soft, nontender, nondistended, no masses or organomegaly  Extremities:  peripheral pulses normal, no pedal edema, no clubbing or cyanosis    Any additional physical findings:    VENT SETTINGS (Comprehensive) (if applicable):  Vent Information  Vent Type: Servo i  Vent Mode: PRVC  Vt Ordered: 420 mL  Rate Set: 18 bmp  FiO2 : 100 %  SpO2: 100 %  SpO2/FiO2 ratio: 100  Sensitivity: 5  PEEP/CPAP: 5  I Time/ I Time %: 0.9 s  Humidification Source: HME  Additional Respiratory  Assessments  Pulse: 98  Resp: 18  SpO2: 100 %  End Tidal CO2: 42  Position: Semi-Stevens's  Humidification Source: HME  Oral Care Completed?: Yes  Oral Care: Mouth suctioned    Laboratory findings:-    CBC:   Recent Labs     03/17/21  0548   WBC 8.0   HGB 14.4        BMP:    Recent Labs     03/17/21  0548      K 3.5*   CL 98   CO2 18*   BUN 8   CREATININE 1.00*   GLUCOSE 163*     S. Calcium:  Recent Labs     03/17/21  0548   CALCIUM 9.6     S. Ionized Calcium:No results for input(s): IONCA in the last 72 hours. S. Magnesium:  Recent Labs     03/17/21  0548   MG 2.1     S. Phosphorus:No results for input(s): PHOS in the last 72 hours.   S. Glucose:  Recent Labs 03/17/21  0518   POCGLU 132*     Glycosylated hemoglobin A1C: No results for input(s): LABA1C in the last 72 hours. INR:   Recent Labs     03/17/21 0548   INR 1.0     Hepatic functions:   Recent Labs     03/17/21 0548   ALKPHOS 109*   ALT 49*   AST 33*   PROT 8.0   BILITOT 0.64   LABALBU 4.3     Pancreatic functions:No results for input(s): LACTA, AMYLASE in the last 72 hours. S. Lactic Acid: No results for input(s): LACTA in the last 72 hours. Cardiac enzymes:  Recent Labs     03/17/21 0548   CKTOTAL 73     BNP:No results for input(s): BNP in the last 72 hours. Lipid profile: No results for input(s): CHOL, TRIG, HDL, LDLCALC in the last 72 hours.     Invalid input(s): LDL  Blood Gases: No results found for: PH, PCO2, PO2, HCO3, O2SAT  Thyroid functions: No results found for: TSH     Urinalysis:     Microbiology:    Cultures during this admission:     Blood cultures:                 [x] None drawn      [] Negative             []  Positive (Details:  )  Urine Culture:                   [x] None drawn      [] Negative             []  Positive (Details:  )  Sputum Culture:               [x] None drawn       [] Negative             []  Positive (Details:  )   Endotracheal aspirate:     [x] None drawn       [] Negative             []  Positive (Details:  )         -----------------------------------------------------------------  Radiological reports:    (See actual reports for details)      Recent Cardiac Catheterization summary:   (See actual reports for details)    Electrocardiogram:     Last Echocardiogram findings:   (See actual reports for details)       Assessment and Plan       Patient Active Problem List   Diagnosis    Admitted to substance misuse detoxification center    Attention deficit hyperactivity disorder (ADHD)    Borderline personality disorder in adult [facility])    Drug abuse, opioid type ([facility] [address]    Use of nicotine containing substance in combustion-free vaporization device    Elevated LFTs    C intubated in the emergency room. EKG showed prolonged QTC received 2 g of magnesium. Urine drug screen was positive for cocaine and benzodiazepine otherwise negative. When I saw her she was on propofol and Versed coming down on Versed drip currently on propofol. We will continue follow-up EKG every 4-6 hours follow-up QTc interval.  Will need repeat magnesium as the second QTC was still prolonged. Will wean her off Versed drip restart her on fentanyl. Ventilator setting PRVC/18/420/5/100 percent and ABG was 7.3 7/42/440/24, she had normal bicarbonate. Chest x-ray reviewed showed endotracheal tube in good position. No infiltrate seen  Follow-up neurological status and mentation at regular interval with interruption of sedation. Discussed with nursing staff, treatment and plan discussed. Discussed with respiratory therapist.    Total critical care time caring for this patient with life threatening, unstable organ failure, including direct patient contact, management of life support systems, review of data including imaging and labs, discussions with other team members and physicians at least 39  Min so far today, excluding procedures. Please note that this chart was generated using voice recognition Dragon dictation software. Although every effort was made to ensure the accuracy of this automated transcription, some errors in transcription may have occurred.      Erin Mariscal MD  3/17/2021 10:50 AM

## 2021-03-17 NOTE — ED PROVIDER NOTES
[facility]     Emergency Department     Faculty Attestation    I performed a history and physical examination of the patient and discussed management with the resident. I have reviewed and agree with the residents findings including all diagnostic interpretations, and treatment plans as written. Any areas of disagreement are noted on the chart. I was personally present for the key portions of any procedures. I have documented in the chart those procedures where I was not present during the key portions. I have reviewed the emergency nurses triage note. I agree with the chief complaint, past medical history, past surgical history, allergies, medications, social and family history as documented unless otherwise noted below. Documentation of the HPI, Physical Exam and Medical Decision Making performed by [email or web address] is based on my personal performance of the HPI, PE and MDM. For Physician Assistant/ Nurse Practitioner cases/documentation I have personally evaluated this patient and have completed at least one if not all key elements of the E/M (history, physical exam, and MDM). Additional findings are as noted.     32 yo F intentionally took trazodone 100 mg x 60, hx iv fentanyl use,   No reported injury, no vomit,   pe sbp 80s, respiratory distress, decrease mentation, pupils = / sluggish,   No cervical tenderness, crepitus or deformity, chest symmetric, abdomen non tender, no distension, no rigidity,   Needle tracks L upper extremity, no calf swelling,     -poor respiratory drive, hypotension, little response to narcan intranasal, pt hyper oxygenated, intubated with glide scope first pass, ett visualized through vocal cord,   -poison control notified, icu admit  -poor iv access, I was present for r IJ central line placement / per sterile technique,   cxr stable, post line placement, ett re adjusted, good position,   Care turned over to day shift      EKG Interpretation    Interpreted by me  Normal sinus, heart rate 123, no ischemia, normal axis, QT corrected 598    CRITICAL CARE: There was a high probability of clinically significant/life threatening deterioration in this patient's condition which required my urgent intervention. Total critical care time was 25 minutes. This excludes any time for separately reportable procedures.        [place] [id number], DO  03/17/21 [address] [place] [address],   03/17/21 [facility]

## 2021-03-17 NOTE — ED PROVIDER NOTES
[facility] ED  Emergency Department  Senior Resident Attestation     Primary Care Physician  Gemma Lazo MD    I performed a history and physical examination of the patient and discussed management with the zakia resident. I reviewed the zakia residents note and agree with the documented findings and plan of care. Any areas of disagreement are noted on the chart. Case was then discussed with Faculty Attending Supervisor for additional medical management. PERTINENT ATTENDING PHYSICIAN COMMENTS:    HISTORY:   Christiano Ram is a 31 y.o. female who  has a past medical history of Anxiety, Borderline personality disorder ([address] Depression, Disease of blood and blood forming organ, Drug use, Hepatitis C, History of iron deficiency anemia, and Liver disease. and presents with complaint of OD trazonde 60 tabs 100mg each with SI. Occurred within 1 hr. PHYSICAL:   Temp: 99.5 °F (37.5 °C),  Pulse: 130, Resp: 28, BP: 109/72, SpO2: 100 %  Gen: toxic , altered, sonorous resp, hypotension tachycardic  Neck: Supple  Cards: NO MRG  Pulm: Lung sounds clear to auscultation  Abdomen: Soft, non-tender, non-distended  Skin: warm, dry  Extremities: pulses 2+ radial / dorsalis pedis, no clubbing, cyanosis, edema    IMPRESSION:   SI overdose, intubation, labs, tox, ekg, cxr. Central line. Sedation and ICU admission. Posion control.      CRITICAL CARE TIME:    55 min    Leatha Prince DO  Senior Resident  Physician    (Please note that portions of this note were completed with a voice recognition program.  Efforts were made to edit the dictations but occasionally words are mis-transcribed.)        Leatha Prince DO  Resident  03/17/21 0820

## 2021-03-17 NOTE — ED NOTES
Dr. Naresh Restrepo, Dr. Giuseppe Curry at bedside for intubation.  RT Vania Mariscal at beside     TAYLA Crowell  03/17/21 2022

## 2021-03-17 NOTE — ED NOTES
Pt intubated with 7.5 ET tube, 20 at the teeth. Positive color change and bilateral breath sounds.      Douglas Villeda RN  03/17/21 0350

## 2021-03-17 NOTE — ED NOTES
Pt ot ED from home after taking 60 trazadone pills. Pt reports fentanyl use 1 hr ago. Pt arrives lethargic, slow in response to questions. Pt state she wanted to kill herself. Medical team at bedside to intubated.      Josue Brock RN  03/17/21 0619

## 2021-03-17 NOTE — PROGRESS NOTES
Charting intubations and reintubations    ETT Size:  7.5  ETT Placement :  20 at lip  ETT secured with  adrian    Tube placement verified by:   Auscultation : yes  Positive color change on end tidal CO2 detector : yes  CXR: yes    Reason for intubation: Impending respiratory failure    If difficult airway, was red tape placed: no  If code situation, was rescue pod used?: no    -- Notify charge therapist regarding all intubations, extubations, reintubations and self extubations    Sanjuana Jackman  6:06 AM

## 2021-03-17 NOTE — PROGRESS NOTES
Ventilator Bronchodilator assessment    Breath sounds: Clear  Inspiratory Pressure: 16  Plateau Pressure: 14    Patient assessed at level 1          []    Bronchodilator Assessment    BRONCHODILATOR ASSESSMENT SCORE  Score 0 (Home) 1 2 3 4   Breath Sounds   []  Chronic Ventilator: Patient at baseline [x]  Mild Wheezes/ Clear []  Intermittent wheezes with good air entry []  Bilateral/unilateral wheezing with diminished air entry []  Insp/Exp wheeze and/or poor aeration   Ventilator Pressures   []  Chronic Ventilator [x]  Insp. Pressure less than 25 cm H20 []  Insp. Pressure less than 25 cm H20 []  Insp. Pressure exceeds 25 cm H20 []  Insp.  Pressure exceeds 30 cm H20   Plateau Pressure []  NA   [x]  Plateau Pressure less than 4  []  Plateau Pressure less than or equal to 5 []  Plateau Pressure greater than or equal to 6 []  Plateau Pressure greater than or equal to [address]  4:32 PM

## 2021-03-17 NOTE — PLAN OF CARE
Problem: Non-Violent Restraints  Goal: Removal from restraints as soon as assessed to be safe  Outcome: Ongoing     Problem: Non-Violent Restraints  Goal: Removal from restraints as soon as assessed to be safe  Outcome: Ongoing  Goal: No harm/injury to patient while restraints in use  Outcome: Ongoing  Goal: Patient's dignity will be maintained  Outcome: Ongoing

## 2021-03-17 NOTE — CARE COORDINATION
Transition planning:  Attempt to see pt. Pt is on vent intubated and sedated at this time. [id number] Attempt to reach emergency contact listed Susy  [phone] reached  left message to contact  as a person was admitted and has her listed as an emergency contact. Also, provided nursing unit main number as well.

## 2021-03-17 NOTE — ED PROVIDER NOTES
[facility] ED  Emergency Department Encounter  EmergencyMedicineResident     This patient was seen during the COVID-19 crisis. There were limited resources and those resources we did have had to be conserved for the sickest of patients. Pt Name: Julia Paul  MRN: [id number]  [place] 1990  Date of evaluation: 3/17/21  PCP: Svitlana Masterson MD    [facility] Complaint   Patient presents with    Drug Overdose       HISTORY OF PRESENT ILLNESS  (Location/Symptom, Timing/Onset, Context/Setting, Quality, Duration, Modifying Factors, Severity.)      Julia Paul is a 31 y.o. female who presents for evaluation of trazodone overdose. Reportedly patient took (60) 100 mg trazodone pills prior to arrival attempt to kill her self. She also reports using IV fentanyl last use 1 hour prior to arrival.  Patient denies any alcohol use. Past medical history is significant for multisubstance drug abuse and hepatitis C liver disease. She is unable to provide us any other history at this time due to her condition. PAST MEDICAL / SURGICAL /SOCIAL / FAMILY HISTORY      has a past medical history of Anxiety, Borderline personality disorder ([address] Depression, Disease of blood and blood forming organ, Drug use, Hepatitis C, History of iron deficiency anemia, and Liver disease. has a past surgical history that includes Appendectomy and Appendectomy (Right).       Social History     Socioeconomic History    Marital status: Single     Spouse name: Not on file    Number of children: Not on file    Years of education: Not on file    Highest education level: Not on file   Occupational History    Not on file   Social Needs    Financial resource strain: Not hard at all    Food insecurity     Worry: Never true     Inability: Never true   [employer] needs     Medical: Not on file     Non-medical: Not on file   Tobacco Use    Smoking status: Former Smoker     Packs/day: 1.00 Types: Cigarettes    Smokeless tobacco: Never Used    Tobacco comment: 0.5 ppd   Substance and Sexual Activity    Alcohol use: No    Drug use: Yes     Types: IV, Opiates , Marijuana     Comment: IV/ heroin/fentanyl- 0.5 g/ day/ 5 months/ last used 1-28-20 @ noon; smoke 1 gram of marjuana/day; sober for 1 year, relapsed 1/2019; started use 5 yrs ago and a became a problem then; no current legal issues    Sexual activity: Yes     Partners: Female   Lifestyle    Physical activity     Days per week: Not on file     Minutes per session: Not on file    Stress: Not on file   Relationships    Social connections     Talks on phone: Not on file     Gets together: Not on file     Attends Faith service: Not on file     Active member of club or organization: Not on file     Attends meetings of clubs or organizations: Not on file     Relationship status: Not on file    Intimate partner violence     Fear of current or ex partner: Not on file     Emotionally abused: Not on file     Physically abused: Not on file     Forced sexual activity: Not on file   Other Topics Concern    Not on file   Social History Narrative    Not on file       Family History   Problem Relation Age of Onset    No Known Problems Mother         no health problems    No Known Problems Father         no health problems        Allergies:  Aspirin, Codeine, and Ibuprofen    Home Medications:  Prior to Admission medications    Medication Sig Start Date End Date Taking?  Authorizing Provider   cariprazine hcl (VRAYLAR) 1.5 MG capsule Take 1.5 mg by mouth daily    Historical Provider, MD   busPIRone (BUSPAR) 10 MG tablet Take 5 mg by mouth 2 times daily    Historical Provider, MD   buPROPion (WELLBUTRIN XL) 300 MG extended release tablet 1 tablet in the morning    Historical Provider, MD   vilazodone HCl (VILAZODONE HCL) 20 MG TABS Take 20 mg by mouth daily    Historical Provider, MD   guanFACINE (INTUNIV) 2 MG TB24 extended release tablet Take 2 mg by mouth daily    Historical Provider, MD   lamoTRIgine (LAMICTAL) 100 MG tablet Take 50 mg by mouth 2 times daily     Historical Provider, MD   traZODone (DESYREL) 100 MG tablet Take 150 mg by mouth nightly     Historical Provider, MD       REVIEW OF SYSTEMS    (2-9 systems for level 4, 10 or more forlevel 5)      Review of Systems   Unable to perform ROS: Mental status change       PHYSICAL EXAM   (up to 7 for level 4, 8 or more forlevel 5)      ED TRIAGE VITALS BP: (!) 86/56, Temp: 98.4 °F (36.9 °C), Pulse: 94, Resp: 16, SpO2: 100 %    Vitals:    03/17/21 0605 03/17/21 0606 03/17/21 0630 03/17/21 0645   BP: 109/72  103/60 (!) 123/105   Pulse:   107 105   Resp:   13 14   Temp:  99.5 °F (37.5 °C)     TempSrc:  Bladder     SpO2:   100% 100%   Weight:       Height:             Physical Exam  Constitutional:       Appearance: She is obese. She is ill-appearing. HENT:      Head: Normocephalic. Mouth/Throat:      Mouth: Mucous membranes are dry. Eyes:      Comments: Pupils 3 mm bilaterally sluggishly reactive to light, no nystagmus, no locked-in gaze   Neck:      Musculoskeletal: Normal range of motion and neck supple. Cardiovascular:      Rate and Rhythm: Regular rhythm. Tachycardia present. Pulses: Normal pulses. Pulmonary:      Effort: Pulmonary effort is normal. No respiratory distress. Breath sounds: Normal breath sounds. Abdominal:      General: Abdomen is flat. Bowel sounds are normal.      Palpations: Abdomen is soft. Musculoskeletal: Normal range of motion. General: No deformity. Skin:     General: Skin is warm and dry. Capillary Refill: Capillary refill takes less than 2 seconds. Neurological:      Mental Status: She is disoriented.    Psychiatric:      Comments: Distant, lethargic, intoxicated           DIFFERENTIAL  DIAGNOSIS     PLAN (LABS / IMAGING / EKG):  Orders Placed This Encounter   Procedures    Culture, Urine    COVID-19, Rapid    XR CHEST PORTABLE    XR CHEST PORTABLE    CBC auto differential    Comprehensive Metabolic Panel w/ Reflex to MG    Urinalysis    APTT    Protime-INR    Lipase    BLOOD GAS, VENOUS    CK    Urine Drug Screen    TOX SCR, BLD, ED    Troponin    Brain Natriuretic Peptide    Troponin    Blood gas, arterial    Magnesium    Microscopic Urinalysis    Telemetry Monitoring    Inpatient consult to Critical Care    Initiate RT Adult Mechanical Ventilation Protocol    Manual Mechanical Ventilation    End Tidal CO2 Continuous    ABG draw    Pulse oximetry, continuous    Initiate Oxygen Therapy Protocol    POC Glucose Fingerstick    EKG 12 Lead    PATIENT STATUS (FROM ED OR OR/PROCEDURAL) Inpatient    Restraints non-violent or non-self-destructive       MEDICATIONS ORDERED:  ED Medication Orders (From admission, onward)    Start Ordered     Status Ordering Provider    03/17/21 0630 03/17/21 0617  magnesium sulfate 2000 mg in 50 mL IVPB premix  ONCE      Last MAR action: New Bag - by WAGNER PRO on 03/17/21 at 0659 Sun Lacy     03/17/21 0630 03/17/21 0620  0.9 % sodium chloride bolus  ONCE      Last MAR action: Stopped - by Anabel Whiteside on 03/17/21 at 0709 Sun Bambi     03/17/21 0600 03/17/21 0556  midazolam (VERSED) 1 mg/mL in D5W infusion  CONTINUOUS      Last MAR action: Rate/Dose Change - by WAGNER PRO on 03/17/21 at 0553 Love Exon L    03/17/21 0556 03/17/21 0556  naloxone (NARCAN) injection 2 mg  PRN      Last MAR action: Given - by WAGNER PRO on 03/17/21 at [address] [place], [place] [address]SRINI    03/17/21 0545 03/17/21 0544  propofol injection  TITRATED      Last MAR action: Rate/Dose Change - by WAGNER PRO on 03/17/21 at [place] [address], SRINI L          DDX: Trazodone overdose, fentanyl overdose    DIAGNOSTIC RESULTS / EMERGENCY DEPARTMENT COURSE / MDM     IMPRESSION & INITIAL PLAN:  This a 24-year-old female presents emerged from today after reportedly taking (60) 100 mg trazodone pills. Patient also reports she used IV fentanyl and use 1 hour prior to arrival.  On initial presentation the patient appeared altered with waxing mental status. She was able to answer questions appropriately however her mental status continued to deteriorate. Patient was intubated for airway protection and anticipated mental status decline. Point-of-care glucose was obtained was 132, Narcan was also administered as fentanyl intoxication was suspected. However her mental status change did not improve. Poison control was consulted on the case their main recommendation was for EKG to evaluate the QTC. Her QTC on evaluation was 598. Patient was given 2 g of magnesium immediately. Patient was intubated successfully and required large amounts of both Versed and propofol for sedation. A right internal jugular central line was placed successfully in a sterile manner. PROCEDURE NOTE - CENTRAL VENOUS LINE PLACEMENT    PATIENT NAME: Abdulaziz Brody  MEDICAL RECORD NO. [id number]  DATE: 3/17/2021  ATTENDING PHYSICIAN: Adrien Lehman    PREOPERATIVE DIAGNOSIS:  vascular access and poor peripheral access  POSTOPERATIVE DIAGNOSIS:  Same  PROCEDURE PERFORMED:  Right Internal Jugular Vein Central Line Insertion  PERFORMING PHYSICIAN: Firman Sandifer, MD  ANESTHESIA:  Local utilizing 1% lidocaine  ESTIMATED BLOOD LOSS:  Less than 25 ml  COMPLICATIONS:  None immediately appreciated. DISCUSSION:  Abdulaziz Brody is a 31y.o.-year-old female who requires central IV access vascular access. The history and physical examination were reviewed and confirmed. CONSENT: Unable to be obtained due to the emergent nature of this procedure. PROCEDURE:  A timeout was initiated by the bedside nurse and was confirmed by those present. The patient was placed in a supine position. The skin overlying the Right Internal Jugular Vein was prepped with chlorhexadine and draped in sterile fashion. The skin was infiltrated with local anesthetic. The vessel and surrounding anatomy was visualized using ultrasound. Through the anesthetized region, the introducer needle was inserted into the internal jugular vein returning dark red non pulsatile blood. A guidewire was placed through the center of the needle with no resistance. Ultrasound confirmed presence of wire in the vein. A small incision made in the skin with a #11 scalpel blade. The dilator was inserted into the skin and vein over guidewire using Seldinger technique. The dilator was then removed and the F 20cm catheter was placed in the vein over the guidewire using Seldinger technique. The guidewire was then removed and all ports aspirated and flushed appropriately. The catheter then secured using silk suture and a temporary sterile dressing was applied. No immediate complication was evident. All sponge, instrument and needle counts were correct at the completion of the procedure. Postprocedural chest x-ray showed good position of the catheter with no evidence of hemothorax or pneumothorax. The patient tolerated the procedure well with no immediate complication evident. Jenni Artis MD  7:16 AM, 3/17/21       PROCEDURE NOTE - EMERGENCY INTUBATION    PATIENT NAME: Randal Marks  MEDICAL RECORD NO. [id number]  DATE: 3/17/2021  ATTENDING PHYSICIAN: Shelby Chacon    PREOPERATIVE DIAGNOSIS:  Acute Respiratory Failure  POSTOPERATIVE DIAGNOSIS:  Same  PROCEDURE PERFORMED:  Emergency endotracheal intubation  PERFORMING PHYSICIAN: Jenni Artis MD    MEDICATIONS: etomidate intravenously and succinycholine intravenously    DISCUSSION:  Randal Marks is a 31y.o.-year-old female who requires intubation and ventilatory support due to impending respiratory failure. The history and physical examination were reviewed and confirmed. CONSENT: Unable to be obtained due to the emergent nature of this procedure. PROCEDURE:  A timeout was initiated by the bedside nurse and was confirmed by those present. The patient was placed in the appropriate position. Cricoid pressure was not required. Intubation was performed by direct laryngoscopy using a laryngoscope and a 7.5 cuffed endotracheal tube. The cuff was then inflated and the tube was secured appropriately at a distance of 22 cm to the dental ridge. Initial confirmation of placement included bilateral breath sounds, an end tidal CO2 detector, absence of sounds over the stomach, tube fogging and adequate chest rise. A chest x-ray to verify correct placement of the tube showed appropriate tube position. The patient tolerated the procedure well. COMPLICATIONS:  None     Elana Chu MD  7:16 AM, 3/17/21      LABS:  Results for orders placed or performed during the hospital encounter of 03/17/21   COVID-19, Rapid    Specimen: Nasopharyngeal Swab   Result Value Ref Range    Specimen Description . NASOPHARYNGEAL SWAB     SARS-CoV-2, Rapid Not Detected Not Detected   CBC auto differential   Result Value Ref Range    WBC 8.0 3.5 - 11.3 k/uL    RBC 4.36 3.95 - 5.11 m/uL    Hemoglobin 14.4 11.9 - 15.1 g/dL    Hematocrit 43.7 36.3 - 47.1 %    .2 82.6 - 102.9 fL    MCH 33.0 25.2 - 33.5 pg    MCHC 33.0 28.4 - 34.8 g/dL    RDW 12.5 11.8 - 14.4 %    Platelets 305 730 - 620 k/uL    MPV 8.8 8.1 - 13.5 fL    NRBC Automated 0.0 0.0 per 100 WBC    Differential Type NOT REPORTED     Seg Neutrophils 57 36 - 65 %    Lymphocytes 32 24 - 43 %    Monocytes 6 3 - 12 %    Eosinophils % 4 1 - 4 %    Basophils 1 0 - 2 %    Immature Granulocytes 0 0 %    Segs Absolute 4.57 1.50 - 8.10 k/uL    Absolute Lymph # 2.56 1.10 - 3.70 k/uL    Absolute Mono # 0.48 0.10 - 1.20 k/uL    Absolute Eos # 0.28 0.00 - 0.44 k/uL    Basophils Absolute 0.05 0.00 - 0.20 k/uL    Absolute Immature Granulocyte 0.03 0.00 - 0.30 k/uL    WBC Morphology NOT REPORTED     RBC Morphology NOT REPORTED     Platelet Estimate NOT REPORTED    Comprehensive Metabolic Panel w/ Reflex to MG   Result Value Ref Range    Glucose 163 (H) 70 - 99 mg/dL    BUN 8 6 - 20 mg/dL    CREATININE 1.00 (H) 0.50 - 0.90 mg/dL    Bun/Cre Ratio NOT REPORTED 9 - 20    Calcium 9.6 8.6 - 10.4 mg/dL    Sodium 136 135 - 144 mmol/L    Potassium 3.5 (L) 3.7 - 5.3 mmol/L    Chloride 98 98 - 107 mmol/L    CO2 18 (L) 20 - 31 mmol/L    Anion Gap 20 (H) 9 - 17 mmol/L    Alkaline Phosphatase 109 (H) 35 - 104 U/L    ALT 49 (H) 5 - 33 U/L    AST 33 (H) <32 U/L    Total Bilirubin 0.64 0.3 - 1.2 mg/dL    Total Protein 8.0 6.4 - 8.3 g/dL    Albumin 4.3 3.5 - 5.2 g/dL    Albumin/Globulin Ratio 1.2 1.0 - 2.5    GFR Non-African American >60 >60 mL/min    GFR African American >60 >60 mL/min    GFR Comment          GFR Staging NOT REPORTED    Urinalysis   Result Value Ref Range    Color, UA YELLOW YELLOW    Turbidity UA CLOUDY (A) CLEAR    Glucose, Ur NEGATIVE NEGATIVE    Bilirubin Urine NEGATIVE NEGATIVE    Ketones, Urine NEGATIVE NEGATIVE    Specific Gravity, UA 1.012 1.005 - 1.030    Urine Hgb TRACE (A) NEGATIVE    pH, UA 5.0 5.0 - 8.0    Protein, UA TRACE (A) NEGATIVE    Urobilinogen, Urine Normal Normal    Nitrite, Urine NEGATIVE NEGATIVE    Leukocyte Esterase, Urine NEGATIVE NEGATIVE    Urinalysis Comments NOT REPORTED    APTT   Result Value Ref Range    PTT 18.7 (L) 20.5 - 30.5 sec   Protime-INR   Result Value Ref Range    Protime 10.4 9.1 - 12.3 sec    INR 1.0    Lipase   Result Value Ref Range    Lipase 15 13 - 60 U/L   BLOOD GAS, VENOUS   Result Value Ref Range    pH, Silvio 7.280 (L) 7.320 - 7.420    pCO2, Silvio 45.3 39 - 55    pO2, Silvio 43.7 30 - 50    HCO3, Venous 20.6 (L) 24 - 30 mmol/L    Positive Base Excess, Silvio NOT REPORTED 0.0 - 2.0 mmol/L    Negative Base Excess, Silvio 5.8 (H) 0.0 - 2.0 mmol/L    O2 Sat, Silvio 73.0 60.0 - 85.0 %    Total Hb NOT REPORTED 12.0 - 16.0 g/dl    Oxyhemoglobin NOT REPORTED 95.0 - 98.0 %    Carboxyhemoglobin 4.5 0 - 5 %    Methemoglobin NOT REPORTED 0.0 - 1.5 %    Pt Temp 37.0     pH, Silvio, Temp Adj NOT REPORTED 7.320 - 7.420 pCO2, Silvio, Temp Adj NOT REPORTED 39 - 55 mmHg    pO2, Silvio, Temp Adj NOT REPORTED 30 - 50 mmHg    O2 Device/Flow/% NOT REPORTED     Respiratory Rate NOT REPORTED     Ambrose Test NOT REPORTED     Sample Site NOT REPORTED     Pt. Position NOT REPORTED     Mode NOT REPORTED     Set Rate NOT REPORTED     Total Rate NOT REPORTED     VT NOT REPORTED     FIO2 UNKNOWN     Peep/Cpap NOT REPORTED     PSV NOT REPORTED     Text for Respiratory NOT REPORTED     NOTIFICATION NOT REPORTED     NOTIFICATION TIME NOT REPORTED    CK   Result Value Ref Range    Total CK 73 26 - 192 U/L   TOX SCR, BLD, ED   Result Value Ref Range    Acetaminophen Level <5 (L) 10 - 30 ug/mL    Ethanol <10 <10 mg/dL    Ethanol percent <9.324 <1.273 %    Salicylate Lvl <1 (L) 3 - 10 mg/dL    Toxic Tricyclic Sc,Blood NEGATIVE NEGATIVE   Brain Natriuretic Peptide   Result Value Ref Range    Pro-BNP 38 <300 pg/mL    BNP Interpretation Pro-BNP Reference Range:    Troponin   Result Value Ref Range    Troponin, High Sensitivity <6 0 - 14 ng/L    Troponin T NOT REPORTED <0.03 ng/mL    Troponin Interp NOT REPORTED    Magnesium   Result Value Ref Range    Magnesium 2.1 1.6 - 2.6 mg/dL   POC Glucose Fingerstick   Result Value Ref Range    POC Glucose 132 (H) 65 - 105 mg/dL   EKG 12 Lead   Result Value Ref Range    Ventricular Rate 123 BPM    Atrial Rate 123 BPM    P-R Interval 132 ms    QRS Duration 84 ms    Q-T Interval 418 ms    QTc Calculation (Bazett) 598 ms    P Axis 66 degrees    R Axis 63 degrees    T Axis 65 degrees       RADIOLOGY:  XR CHEST PORTABLE   Final Result   Interval placement of right IJ central line. No infiltrates. No   pneumothorax. XR CHEST PORTABLE   Final Result   1. No acute cardiopulmonary disease. 2. The endotracheal tube tip is located 7.0 cm above the guillermina. This could   be advanced 2-3 cm for more optimal positioning. ECG:  Tachycardia ventricular 123. QTC prolonged at 598.     All EKG's are interpreted by the Emergency Department Physician who either signsor Co-signs this chart in the absence of a cardiologist.    CONSULTS:  IP CONSULT TO CRITICAL CARE    CRITICAL CARE:  See attending physician note    FINAL IMPRESSION      1. Overdose of trazodone          DISPOSITION / PLAN     DISPOSITION Admitted 03/17/2021 06:59:20 AM      PATIENT REFERRED TO:  No follow-up provider specified.     DISCHARGE MEDICATIONS:  New Prescriptions    No medications on file     Modified Medications    No medications on file        Liseth Osman MD  Emergency Medicine Resident    (Please note that portions of this note were completed with a voice recognition program.  Efforts were made to edit the dictations but occasionally words are mis-transcribed.)       Liseth Osman MD  Resident  03/17/21 8326

## 2021-03-18 ENCOUNTER — APPOINTMENT (OUTPATIENT)
Dept: GENERAL RADIOLOGY | Age: 31
DRG: 817 | End: 2021-03-18
Payer: COMMERCIAL

## 2021-03-18 LAB
ABSOLUTE EOS #: 0.12 K/UL (ref 0–0.44)
ABSOLUTE IMMATURE GRANULOCYTE: <0.03 K/UL (ref 0–0.3)
ABSOLUTE LYMPH #: 1.78 K/UL (ref 1.1–3.7)
ABSOLUTE MONO #: 0.75 K/UL (ref 0.1–1.2)
ALLEN TEST: POSITIVE
ANION GAP SERPL CALCULATED.3IONS-SCNC: 9 MMOL/L (ref 9–17)
BASOPHILS # BLD: 0 % (ref 0–2)
BASOPHILS ABSOLUTE: <0.03 K/UL (ref 0–0.2)
BUN BLDV-MCNC: 3 MG/DL (ref 6–20)
BUN/CREAT BLD: ABNORMAL (ref 9–20)
CALCIUM IONIZED: 1.12 MMOL/L (ref 1.13–1.33)
CALCIUM SERPL-MCNC: 8.2 MG/DL (ref 8.6–10.4)
CHLORIDE BLD-SCNC: 109 MMOL/L (ref 98–107)
CO2: 22 MMOL/L (ref 20–31)
CREAT SERPL-MCNC: 0.58 MG/DL (ref 0.5–0.9)
CULTURE: NO GROWTH
DIFFERENTIAL TYPE: ABNORMAL
EKG ATRIAL RATE: 101 BPM
EKG ATRIAL RATE: 86 BPM
EKG ATRIAL RATE: 91 BPM
EKG ATRIAL RATE: 95 BPM
EKG ATRIAL RATE: 96 BPM
EKG P AXIS: 67 DEGREES
EKG P AXIS: 70 DEGREES
EKG P AXIS: 71 DEGREES
EKG P AXIS: 73 DEGREES
EKG P AXIS: 79 DEGREES
EKG P-R INTERVAL: 134 MS
EKG P-R INTERVAL: 138 MS
EKG P-R INTERVAL: 140 MS
EKG P-R INTERVAL: 142 MS
EKG P-R INTERVAL: 146 MS
EKG Q-T INTERVAL: 380 MS
EKG Q-T INTERVAL: 396 MS
EKG Q-T INTERVAL: 410 MS
EKG Q-T INTERVAL: 412 MS
EKG Q-T INTERVAL: 416 MS
EKG QRS DURATION: 82 MS
EKG QRS DURATION: 84 MS
EKG QRS DURATION: 84 MS
EKG QRS DURATION: 92 MS
EKG QRS DURATION: 98 MS
EKG QTC CALCULATION (BAZETT): 473 MS
EKG QTC CALCULATION (BAZETT): 492 MS
EKG QTC CALCULATION (BAZETT): 511 MS
EKG QTC CALCULATION (BAZETT): 515 MS
EKG QTC CALCULATION (BAZETT): 520 MS
EKG R AXIS: 45 DEGREES
EKG R AXIS: 51 DEGREES
EKG R AXIS: 53 DEGREES
EKG R AXIS: 63 DEGREES
EKG R AXIS: 70 DEGREES
EKG T AXIS: 45 DEGREES
EKG T AXIS: 55 DEGREES
EKG T AXIS: 56 DEGREES
EKG T AXIS: 59 DEGREES
EKG T AXIS: 59 DEGREES
EKG VENTRICULAR RATE: 101 BPM
EKG VENTRICULAR RATE: 86 BPM
EKG VENTRICULAR RATE: 91 BPM
EKG VENTRICULAR RATE: 95 BPM
EKG VENTRICULAR RATE: 96 BPM
EOSINOPHILS RELATIVE PERCENT: 2 % (ref 1–4)
FIO2: 30
GFR AFRICAN AMERICAN: >60 ML/MIN
GFR NON-AFRICAN AMERICAN: >60 ML/MIN
GFR SERPL CREATININE-BSD FRML MDRD: ABNORMAL ML/MIN/{1.73_M2}
GFR SERPL CREATININE-BSD FRML MDRD: ABNORMAL ML/MIN/{1.73_M2}
GLUCOSE BLD-MCNC: 100 MG/DL (ref 74–100)
GLUCOSE BLD-MCNC: 91 MG/DL (ref 70–99)
HCT VFR BLD CALC: 35.9 % (ref 36.3–47.1)
HEMOGLOBIN: 11.7 G/DL (ref 11.9–15.1)
IMMATURE GRANULOCYTES: 0 %
LYMPHOCYTES # BLD: 22 % (ref 24–43)
Lab: NORMAL
MAGNESIUM: 2.6 MG/DL (ref 1.6–2.6)
MCH RBC QN AUTO: 33.1 PG (ref 25.2–33.5)
MCHC RBC AUTO-ENTMCNC: 32.6 G/DL (ref 28.4–34.8)
MCV RBC AUTO: 101.7 FL (ref 82.6–102.9)
MODE: ABNORMAL
MONOCYTES # BLD: 9 % (ref 3–12)
NEGATIVE BASE EXCESS, ART: ABNORMAL (ref 0–2)
NRBC AUTOMATED: 0 PER 100 WBC
O2 DEVICE/FLOW/%: ABNORMAL
PATIENT TEMP: ABNORMAL
PDW BLD-RTO: 13 % (ref 11.8–14.4)
PHOSPHORUS: 2.8 MG/DL (ref 2.6–4.5)
PLATELET # BLD: 169 K/UL (ref 138–453)
PLATELET ESTIMATE: ABNORMAL
PMV BLD AUTO: 8.6 FL (ref 8.1–13.5)
POC HCO3: 24.3 MMOL/L (ref 21–28)
POC LACTIC ACID: 0.77 MMOL/L (ref 0.56–1.39)
POC O2 SATURATION: 99 % (ref 94–98)
POC PCO2 TEMP: ABNORMAL MM HG
POC PCO2: 39.3 MM HG (ref 35–48)
POC PH TEMP: ABNORMAL
POC PH: 7.4 (ref 7.35–7.45)
POC PO2 TEMP: ABNORMAL MM HG
POC PO2: 116.7 MM HG (ref 83–108)
POSITIVE BASE EXCESS, ART: 0 (ref 0–3)
POTASSIUM SERPL-SCNC: 3.2 MMOL/L (ref 3.7–5.3)
RBC # BLD: 3.53 M/UL (ref 3.95–5.11)
RBC # BLD: ABNORMAL 10*6/UL
SAMPLE SITE: ABNORMAL
SEG NEUTROPHILS: 67 % (ref 36–65)
SEGMENTED NEUTROPHILS ABSOLUTE COUNT: 5.5 K/UL (ref 1.5–8.1)
SODIUM BLD-SCNC: 140 MMOL/L (ref 135–144)
SPECIMEN DESCRIPTION: NORMAL
TCO2 (CALC), ART: 26 MMOL/L (ref 22–29)
WBC # BLD: 8.2 K/UL (ref 3.5–11.3)
WBC # BLD: ABNORMAL 10*3/UL

## 2021-03-18 PROCEDURE — 2580000003 HC RX 258: Performed by: STUDENT IN AN ORGANIZED HEALTH CARE EDUCATION/TRAINING PROGRAM

## 2021-03-18 PROCEDURE — 82330 ASSAY OF CALCIUM: CPT

## 2021-03-18 PROCEDURE — 93010 ELECTROCARDIOGRAM REPORT: CPT | Performed by: INTERNAL MEDICINE

## 2021-03-18 PROCEDURE — 87040 BLOOD CULTURE FOR BACTERIA: CPT

## 2021-03-18 PROCEDURE — 85025 COMPLETE CBC W/AUTO DIFF WBC: CPT

## 2021-03-18 PROCEDURE — 87205 SMEAR GRAM STAIN: CPT

## 2021-03-18 PROCEDURE — 6370000000 HC RX 637 (ALT 250 FOR IP): Performed by: STUDENT IN AN ORGANIZED HEALTH CARE EDUCATION/TRAINING PROGRAM

## 2021-03-18 PROCEDURE — 82803 BLOOD GASES ANY COMBINATION: CPT

## 2021-03-18 PROCEDURE — 71045 X-RAY EXAM CHEST 1 VIEW: CPT

## 2021-03-18 PROCEDURE — 93005 ELECTROCARDIOGRAM TRACING: CPT | Performed by: STUDENT IN AN ORGANIZED HEALTH CARE EDUCATION/TRAINING PROGRAM

## 2021-03-18 PROCEDURE — 87186 SC STD MICRODIL/AGAR DIL: CPT

## 2021-03-18 PROCEDURE — 87070 CULTURE OTHR SPECIMN AEROBIC: CPT

## 2021-03-18 PROCEDURE — 86403 PARTICLE AGGLUT ANTBDY SCRN: CPT

## 2021-03-18 PROCEDURE — 99291 CRITICAL CARE FIRST HOUR: CPT | Performed by: INTERNAL MEDICINE

## 2021-03-18 PROCEDURE — 82947 ASSAY GLUCOSE BLOOD QUANT: CPT

## 2021-03-18 PROCEDURE — 2060000000 HC ICU INTERMEDIATE R&B

## 2021-03-18 PROCEDURE — 94003 VENT MGMT INPAT SUBQ DAY: CPT

## 2021-03-18 PROCEDURE — 83605 ASSAY OF LACTIC ACID: CPT

## 2021-03-18 PROCEDURE — 6360000002 HC RX W HCPCS: Performed by: STUDENT IN AN ORGANIZED HEALTH CARE EDUCATION/TRAINING PROGRAM

## 2021-03-18 PROCEDURE — 84100 ASSAY OF PHOSPHORUS: CPT

## 2021-03-18 PROCEDURE — 99233 SBSQ HOSP IP/OBS HIGH 50: CPT | Performed by: INTERNAL MEDICINE

## 2021-03-18 PROCEDURE — 36600 WITHDRAWAL OF ARTERIAL BLOOD: CPT

## 2021-03-18 PROCEDURE — 80048 BASIC METABOLIC PNL TOTAL CA: CPT

## 2021-03-18 PROCEDURE — 36415 COLL VENOUS BLD VENIPUNCTURE: CPT

## 2021-03-18 PROCEDURE — 94761 N-INVAS EAR/PLS OXIMETRY MLT: CPT

## 2021-03-18 PROCEDURE — 83735 ASSAY OF MAGNESIUM: CPT

## 2021-03-18 PROCEDURE — 2700000000 HC OXYGEN THERAPY PER DAY

## 2021-03-18 RX ORDER — POTASSIUM CHLORIDE 29.8 MG/ML
40 INJECTION INTRAVENOUS ONCE
Status: COMPLETED | OUTPATIENT
Start: 2021-03-18 | End: 2021-03-18

## 2021-03-18 RX ORDER — MAGNESIUM SULFATE 1 G/100ML
1000 INJECTION INTRAVENOUS ONCE
Status: COMPLETED | OUTPATIENT
Start: 2021-03-18 | End: 2021-03-18

## 2021-03-18 RX ADMIN — POTASSIUM CHLORIDE 40 MEQ: 29.8 INJECTION, SOLUTION INTRAVENOUS at 05:50

## 2021-03-18 RX ADMIN — AMPICILLIN SODIUM AND SULBACTAM SODIUM 3000 MG: 2; 1 INJECTION, POWDER, FOR SOLUTION INTRAMUSCULAR; INTRAVENOUS at 17:05

## 2021-03-18 RX ADMIN — MAGNESIUM SULFATE HEPTAHYDRATE 1000 MG: 1 INJECTION, SOLUTION INTRAVENOUS at 00:23

## 2021-03-18 RX ADMIN — SODIUM CHLORIDE, PRESERVATIVE FREE 10 ML: 5 INJECTION INTRAVENOUS at 07:41

## 2021-03-18 RX ADMIN — POTASSIUM BICARBONATE 40 MEQ: 782 TABLET, EFFERVESCENT ORAL at 20:07

## 2021-03-18 RX ADMIN — AMPICILLIN SODIUM AND SULBACTAM SODIUM 3000 MG: 2; 1 INJECTION, POWDER, FOR SOLUTION INTRAMUSCULAR; INTRAVENOUS at 23:23

## 2021-03-18 RX ADMIN — FAMOTIDINE 20 MG: 20 TABLET, FILM COATED ORAL at 20:07

## 2021-03-18 RX ADMIN — PROPOFOL 25 MCG/KG/MIN: 10 INJECTION, EMULSION INTRAVENOUS at 03:50

## 2021-03-18 RX ADMIN — ACETAMINOPHEN 650 MG: 325 TABLET ORAL at 05:50

## 2021-03-18 RX ADMIN — FAMOTIDINE 20 MG: 20 TABLET, FILM COATED ORAL at 07:41

## 2021-03-18 RX ADMIN — PROPOFOL 20 MCG/KG/MIN: 10 INJECTION, EMULSION INTRAVENOUS at 10:52

## 2021-03-18 RX ADMIN — SODIUM CHLORIDE, PRESERVATIVE FREE 10 ML: 5 INJECTION INTRAVENOUS at 20:07

## 2021-03-18 RX ADMIN — AMPICILLIN SODIUM AND SULBACTAM SODIUM 3000 MG: 2; 1 INJECTION, POWDER, FOR SOLUTION INTRAMUSCULAR; INTRAVENOUS at 10:48

## 2021-03-18 RX ADMIN — SODIUM CHLORIDE: 9 INJECTION, SOLUTION INTRAVENOUS at 03:50

## 2021-03-18 RX ADMIN — ENOXAPARIN SODIUM 40 MG: 40 INJECTION SUBCUTANEOUS at 07:41

## 2021-03-18 ASSESSMENT — PULMONARY FUNCTION TESTS
PIF_VALUE: 13
PIF_VALUE: 16
PIF_VALUE: 15
PIF_VALUE: 17

## 2021-03-18 NOTE — PLAN OF CARE
Problem: OXYGENATION/RESPIRATORY FUNCTION  Goal: Patient will maintain patent airway  Outcome: Ongoing  Goal: Patient will achieve/maintain normal respiratory rate/effort  Description: Respiratory rate and effort will be within normal limits for the patient  Outcome: Ongoing     Problem: MECHANICAL VENTILATION  Goal: Patient will maintain patent airway  Outcome: Ongoing  Goal: Oral health is maintained or improved  Outcome: Ongoing  Goal: ET tube will be managed safely  Outcome: Ongoing  Goal: Ability to express needs and understand communication  Outcome: Ongoing  Goal: Mobility/activity is maintained at optimum level for patient  Outcome: Ongoing     Problem: SKIN INTEGRITY  Goal: Skin integrity is maintained or improved  Outcome: Ongoing     Problem: Non-Violent Restraints  Goal: Removal from restraints as soon as assessed to be safe  3/18/2021 0244 by Arlene Kebede RN  Outcome: Not Met This Shift  3/17/2021 1526 by Gi Stanton RN  Outcome: Ongoing  Goal: No harm/injury to patient while restraints in use  3/18/2021 0244 by Arlene Kebede RN  Outcome: Ongoing  3/17/2021 1526 by Gi Stanton RN  Outcome: Ongoing  Goal: Patient's dignity will be maintained  3/18/2021 0244 by Arlene Kebede RN  Outcome: Ongoing  3/17/2021 1526 by Gi Stanton RN  Outcome: Ongoing     Problem: Suicide risk  Goal: Provide patient with safe environment  Description: Provide patient with safe environment  Outcome: Ongoing     Problem: Airway Clearance - Ineffective:  Goal: Ability to maintain a clear airway will improve  Description: Ability to maintain a clear airway will improve  Outcome: Ongoing     Problem: Aspiration:  Goal: Absence of aspiration  Description: Absence of aspiration  Outcome: Ongoing     Problem: Skin Integrity - Impaired:  Goal: Will show no infection signs and symptoms  Description: Will show no infection signs and symptoms  Outcome: Ongoing  Goal: Absence of new skin breakdown  Description: Absence of new skin breakdown  Outcome: Ongoing     Problem: Falls - Risk of:  Goal: Will remain free from falls  Description: Will remain free from falls  Outcome: Ongoing  Goal: Absence of physical injury  Description: Absence of physical injury  Outcome: Ongoing     Problem: Skin Integrity:  Goal: Will show no infection signs and symptoms  Description: Will show no infection signs and symptoms  Outcome: Ongoing  Goal: Absence of new skin breakdown  Description: Absence of new skin breakdown  Outcome: Ongoing     Electronically signed by Katy Baig RN on 3/18/2021 at 2:44 AM

## 2021-03-18 NOTE — PROGRESS NOTES
drawn      [x] Negative             []  Positive (Details:  )  Sputum Culture:  Pending             [] None drawn       [] Negative             []  Positive (Details:  )   Endotracheal aspirate: Pending     [] None drawn       [] Negative             []  Positive (Details:  )       Consults:      1. Psych   However was told by Ofe MATIAS that patient could not be seen the same day she was extubated, psych needs to be re-paged tomorrow    Assessment:     Patient Active Problem List    Diagnosis Date Noted    Respiratory decompensation 03/17/2021    Attention deficit hyperactivity disorder (ADHD) 08/25/2020    Borderline personality disorder in adult [facility]) 08/25/2020    Drug abuse, opioid type ([facility] [address] 08/25/2020    Use of nicotine containing substance in combustion-free vaporization device 08/25/2020    Elevated LFTs 08/25/2020    Admitted to substance misuse detoxification center 01/28/2020     Neuro  Alert and oriented to person, place, time. GCS 15  Off sedation     Cards  Prolonged QTC upon initial presentation, 473 on EKG today  -Secondary to trazodone toxicity  -2 g of magnesium given in ED       Pulm  Respiratory failure secondary to trazodone toxicity  -Extubated today  -Patient has spiked fevers overnight, concern for aspiration pneumonia,started on Unasyn     GI  Gastric ulcer prophylaxis  -Pepcid     Nutrition  Passed bedside swallow study, diet advanced to general        Renal  Mild SUPRIYA - resolved  -Baseline creatinine of 0.6  -Creatinine today 0.58  Hypokalemia today of 3.2, will replete  Status post 2 g of magnesium for prolonged QTC, mag 2.6 today.   QTc 473  Fluids:  discontinued    ID  Febrile overnight, concern for aspiration pneumonia, on Unasyn  Heme  No acute hematological etiology  -Hemoglobin of 11.7, decreased from 14.       Endo  Hyperglycemia  -Likely secondary to stress response  -No history of diabetes  -Goal of glucose less than 180  -Glucose today 91     DVT: Lovenox  Additional assessment:    1. Intentional drug overdose   2. Suicidal  3. Possible aspiration pneumonia     Recommended Follow-up:     1. Psych consult needed  2. Continue Unasyn, follow up respiratory cultures  3. Monitor for fevers, leukocytosis, hypoxia        Above mentioned assessment and plan was discussed by me with the admitting medicine resident. The medicine team assigned to the patient by medicine admitting resident will be following up the patient from now onwards on the floor.        Obinna Whitney DO  Emergency Medicine Resident  [address]  3/18/2021, 4:38 PM EDT

## 2021-03-18 NOTE — PLAN OF CARE
Problem: OXYGENATION/RESPIRATORY FUNCTION  Goal: Patient will maintain patent airway  3/18/2021 9450 by Christina Stuart, RCMARCIA  Outcome: Ongoing     Problem: OXYGENATION/RESPIRATORY FUNCTION  Goal: Patient will achieve/maintain normal respiratory rate/effort  Description: Respiratory rate and effort will be within normal limits for the patient  3/18/2021 3382 by Christina Stuart, MARK  Outcome: Ongoing     Problem: MECHANICAL VENTILATION  Goal: Patient will maintain patent airway  3/18/2021 0822 by Christina Stuart RCP  Outcome: Ongoing     Problem: MECHANICAL VENTILATION  Goal: Oral health is maintained or improved  3/18/2021 0822 by Christina Stuart, RCP  Outcome: Ongoing     Problem: MECHANICAL VENTILATION  Goal: ET tube will be managed safely  3/18/2021 0822 by Christina Stuart RCP  Outcome: Ongoing     Problem: MECHANICAL VENTILATION  Goal: Ability to express needs and understand communication  3/18/2021 0822 by Christina Stuart, RCP  Outcome: Ongoing     Problem: MECHANICAL VENTILATION  Goal: Mobility/activity is maintained at optimum level for patient  3/18/2021 5607 by Christina Stuart RCP  Outcome: Ongoing     Problem: SKIN INTEGRITY  Goal: Skin integrity is maintained or improved  3/18/2021 0822 by Christina Stuart, RCP  Outcome: Ongoing

## 2021-03-18 NOTE — PLAN OF CARE
Problem: Non-Violent Restraints  Goal: Removal from restraints as soon as assessed to be safe  3/18/2021 1309 by Aye Oates RN  Outcome: Completed  3/18/2021 0244 by Maria T Velázquez RN  Outcome: Not Met This Shift  Goal: No harm/injury to patient while restraints in use  3/18/2021 1309 by Aye Oates RN  Outcome: Completed  3/18/2021 0244 by Maria T Velázquez RN  Outcome: Ongoing  Goal: Patient's dignity will be maintained  3/18/2021 1309 by Aye Oates RN  Outcome: Completed  3/18/2021 0244 by Maria T Velázquez RN  Outcome: Ongoing     Problem: Airway Clearance - Ineffective:  Goal: Ability to maintain a clear airway will improve  Description: Ability to maintain a clear airway will improve  3/18/2021 1309 by Aye Oates RN  Outcome: Completed  3/18/2021 0244 by Maria T Velázquez RN  Outcome: Ongoing

## 2021-03-18 NOTE — FLOWSHEET NOTE
Assessment: Patient is a [address] [place] y.o. female who arrived to the hospital due to an \"overdose. \" Patient remains \"intubated,\" at this time, however, nurse indicated that she is \"responding to commands. \" Patient's nurse indicated that patient \"is improving and may be extubated\" on 3/18. Intervention:  visited patient per initial rounding visits.  learned from bedside nurse, that no emergency contact has been reached since patient's arrival to the hospital. Per nurse, several staff have attempted to reach, Keeley Rosado, patient's emergency contact, with no success.  learned from patient's chart that Martir Hatfield is listed as significant other.  attempted to identify other phone numbers or a recent address for patient's significant other, with no success.  called phone number for emergency contact at [phone], and left voicemail, requesting that she call MICU for further information. Outcome: Patient remained intubated throughout encounter. Plan: Chaplains can make follow-up visit, per request. Jian Dangelo can be reached 24/7 via [employer].     Dany Black     03/18/21 0224   Encounter Summary   Services provided to: Patient not available   Referral/Consult From: [address] Significant other   Continue Visiting   (3/17/2021, pt intubated)   Complexity of Encounter Moderate   Length of Encounter 15 minutes   Routine   Type Initial   Spiritual/[employer]   Type Spiritual support   Assessment   (Intubated)   Intervention Sustaining presence/ Ministry of presence   Outcome   (Intubated)

## 2021-03-18 NOTE — CARE COORDINATION
Case Management Initial Discharge Plan  Cynthia Ortiz,             Met with:patient to discuss discharge plans. Patient drowsy, with quiet voice, hard to understand at times. Information verified: address, contacts, phone number, , insurance Yes, states that she is homeless,     Emergency Contact/Next of Kin name & number: gives no emergency contact--Shivani Krishnan listed as first contact--patient indicates she does not want her as emergency contact, but offered no one else. CM asked about family, she would not respond  PCP: Cathleen Hooks MD  Date of last visit: 2020    Insurance Provider: [address] [place] [address] Drive    Discharge Planning    Living Arrangements:  Alone   Support Systems:  None    Home has na stories  na stairs to climb to get into front door, na stairs to climb to reach second floor  Location of bedroom/bathroom in home na  States she is homeless    Patient able to perform ADL's:Independent    Current Services (outpatient & in home)    DME equipment:    DME provider:      Receiving oral anticoagulation therapy? No    If indicated:   Physician managing anticoagulation treatment: na  Where does patient obtain lab work for ATC treatment? na      Potential Assistance Needed:       Patient agreeable to home care: No  [place] of choice provided:  yes    Prior SNF/Rehab Placement and Facility:    Agreeable to SNF/Rehab: No  [place] of choice provided: n/a     Evaluation: cyril, called Magalie with [facility] and told her patient states she is homeless    Expected Discharge date:       Patient expects to be discharged to: Follow Up Appointment: Best Day/ Time:      Transportation provider: will need a ride  Transportation arrangements needed for discharge: Yes     Readmission Risk              Risk of Unplanned Readmission:        21             Does patient have a readmission risk score greater than 14?: Yes  If yes, follow-up appointment must be made within 7 days of discharge.      Goals of Care: Discharge Plan: social work consulted as patient states she is homeless          Electronically signed by Ethan Rodriguez RN on 3/18/21 at 3:14 PM EDT

## 2021-03-18 NOTE — PROGRESS NOTES
Attending Physician Statement  I have discussed the case, including pertinent history and exam findings with the resident and the team.  I have seen and examined the patient and the key elements of the encounter have been performed by me. I agree with the assessment, plan and orders as documented by the resident. Review of Systems:   In addition to the pertinent positives and negatives as stated within HPI and the review of systems as documented in their notes, all other systems were reviewed when able to and are reported negative. Please refer to resident note for further details. 76-year-old female initially admitted to ICU for overdose. She apparently overdosed on trazodone. She was intubated and then extubated today.   Monitor EKG  Replace electrolytes  She did spike a fever last night and started on Unasyn, follow-up on the cultures and chest x-ray  Suicide precautions  Will obtain psychiatry consult as well  Monitor vitals  Outpatient follow-up for hep C through PCP      Dannie Tolbert MD  Attending Physician, Internal Medicine Service    Internal Medicine Residency Program  3/18/2021, 10:46 PM

## 2021-03-18 NOTE — PROGRESS NOTES
Order obtained for extubation. SpO2 of 100 on 40% FiO2. Patient extubated and placed on 2 liters/min via nasal cannula. Post extubation SpO2 is 97% with HR  97 bpm and RR 22 breaths/min. Patient had strong cough that was non-productive. Extubation Well tolerated by patient. .   Breath Sounds: clear    RENNY PIERCE   12:49 PM

## 2021-03-18 NOTE — PROGRESS NOTES
INTENSIVE CARE UNIT  Resident Physician Progress Note    Keith - Cynthia Ortiz  Date of Admission -  3/17/2021  5:13 AM  Date of Evaluation -  3/18/2021  Room and Bed Number -  0124/0124-01   Hospital Day - 1      SUBJECTIVE:     OVERNIGHT EVENTS:      Patient did have fevers overnight, T-max of 101. 3. No other acute events overnight    TODAY:    Patient awake and following commands, hemodynamically stable requiring no pressors.   [facility] CPAP vent settings 30% FiO2 PEEP of 5, pressure support above PEEP of 8  ABG 7.4/39/116/24     AWAKE & FOLLOWING COMMANDS:  [] No   [x] Yes    SECRETIONS Amount:  [] Small [] Moderate  [] Large  [x] None  Color:     [] White [] Colored  [] Bloody    SEDATION:  RAAS Score:  [x] Propofol gtt  [] Versed gtt  [] Ativan gtt   [] No Sedation    PARALYZED:  20[x] No    [] Yes    VASOPRESSORS:  [x] No    [] Yes  [] Levophed [] Dopamine [] Vasopressin  [] Dobutamine [] Phenylephrine [] Epinephrine      OBJECTIVE:     VITAL SIGNS:  /75   Pulse 100   Temp 100.9 °F (38.3 °C) (Core)   Resp 14   Ht 5' 4\" (1.626 m)   Wt 179 lb 14.3 oz (81.6 kg)   SpO2 100%   BMI 30.88 kg/m²   Tmax over 24 hours:  Temp (24hrs), Av.5 °F (38.1 °C), Min:99.3 °F (37.4 °C), Max:101.3 °F (38.5 °C)      Patient Vitals for the past 8 hrs:   BP Temp Temp src Pulse Resp SpO2 Weight   21 0800 120/75 100.9 °F (38.3 °C) CORE 100 14 100 %    21 0742    99 14 100 %    21 0729    97 15 100 %    21 0700 109/71   84 18 100 %    21 0645    85 18 100 %    21 0630 111/75   86 18 100 %    21 0615    86 18 100 %    21 0600 112/67   85 18 100 % 179 lb 14.3 oz (81.6 kg)   21 0545    87 18 100 %    21 0530 114/70   89 18 100 %    21 0515    88 18 100 %    21 0500 116/69   88 18 100 %    21 0445    90 18 100 %    21 0430 116/74   98 17 100 %    21 0428     19 100 %    03/18/21 0415    90 18 100 %    03/18/21 0400 110/68 101.1 °F (38.4 °C) Bladder 90 18 100 %    03/18/21 0345    92 20 100 %    03/18/21 0330 113/67   93 18 100 %    03/18/21 0329    92 18 100 %    03/18/21 0315    94 18 100 %    03/18/21 0300 114/66   92 18 100 %    03/18/21 0245    93 18 100 %    03/18/21 0230 115/70   93 18 100 %    03/18/21 0215    90 18 100 %    03/18/21 0200 110/62   93 18 100 %    03/18/21 0145    94 18 100 %    03/18/21 0130 115/61   93 18 100 %    03/18/21 0115    95 18 100 %    03/18/21 0100 109/60   94 18 100 %          Intake/Output Summary (Last 24 hours) at 3/18/2021 0848  Last data filed at 3/18/2021 0800  Gross per 24 hour   Intake 2665 ml   Output 2300 ml   Net 365 ml     Date 03/18/21 0000 - 03/18/21 2359   Shift 0185-8626 7522-9431 7497-8487 24 Hour Total   INTAKE   I.V.(mL/kg) 5843(91.1)   1586(80.3)   NG/GT(mL/kg)  100(1.2)  100(1.2)   Shift Total(mL/kg) 0320(54.9) 100(1.2)  1146(14)   OUTPUT   Urine(mL/kg/hr) 450(0.7) 125  575   Emesis/NG output(mL/kg) 250(3.1)   250(3.1)   Shift Total(mL/kg) 700(8.6) 125(1.5)  825(10.1)   Weight (kg) 81.6 81.6 81.6 81.6     Wt Readings from Last 3 Encounters:   03/18/21 179 lb 14.3 oz (81.6 kg)   11/10/20 191 lb (86.6 kg)   09/21/20 195 lb (88.5 kg)     Body mass index is 30.88 kg/m². PHYSICAL EXAM:  Constitutional: Intubated sedated  EENT: PERRLA, 2 mm bilaterally reactive  Neck: Supple, symmetrical, trachea midline, no adenopathy, thyroid symmetric, no jvd skin normal  Respiratory: clear to auscultation, no wheezes or rales and unlabored breathing.  No intercostal tenderness  Cardiovascular: regular rate and rhythm, normal S1, S2, no murmur noted and 2+ pulses throughout  Abdomen: soft, nontender, nondistended, no masses or organomegaly  Extremities:  peripheral pulses normal, no pedal edema, no clubbing or cyanosis      MEDICATIONS:  Scheduled Meds:   potassium bicarb-citric acid  40 mEq Oral BID    ampicillin-sulbactam  3,000 mg Intravenous Q6H    sodium chloride flush  10 mL Intravenous 2 times per day    enoxaparin  40 mg Subcutaneous Daily    famotidine  20 mg Oral BID     Continuous Infusions:   midazolam Stopped (03/17/21 1205)    sodium chloride 125 mL/hr at 03/18/21 0350    propofol 20 mcg/kg/min (03/18/21 9229)     PRN Meds:   naloxone, 2 mg, PRN  sodium chloride flush, 10 mL, PRN  [Held by provider] promethazine, 12.5 mg, Q6H PRN    Or  [Held by provider] ondansetron, 4 mg, Q6H PRN  polyethylene glycol, 17 g, Daily PRN  acetaminophen, 650 mg, Q6H PRN    Or  acetaminophen, 650 mg, Q6H PRN  fentanNYL, 50 mcg, Q2H PRN  albuterol, 2.5 mg, As Directed RT PRN        SUPPORT DEVICES: [x] Ventilator [] BIPAP  [] Nasal Cannula [] Room Air    VENT SETTINGS (Comprehensive) (if applicable):  Vent Information  $Ventilation: $Subsequent Day  Skin Assessment: Clean, dry, & intact  Suction Catheter Diameter: 14  Equipment Changed: Expiratory Filter(HME)  Vent Type: Servo i  Vent Mode: (S) CPAP  Vt Ordered: 420 mL  Rate Set: 18 bmp  Pressure Support: 10 cmH20  FiO2 : 30 %  SpO2: 100 %  SpO2/FiO2 ratio: 333.33  Sensitivity: 5  PEEP/CPAP: 5  I Time/ I Time %: 0.9 s  Humidification Source: HME  Nitric Oxide/Epoprostenol In Use?: No  Additional Respiratory  Assessments  Pulse: 100  Resp: 14  SpO2: 100 %  End Tidal CO2: 43  Position: Semi-Stevens's  Humidification Source: HME  Oral Care Completed?: Yes  Oral Care: Mouth swabbed, Mouth suctioned, Mouthwash, Teeth brushed, Suction toothette  Subglottic Suction Done?: Yes  Cuff Pressure (cm H2O): (MLT)    ABGs:   Arterial Blood Gas result:  pH 7.4; pCO2 39; pO2 116; HCO3 24  Lab Results   Component Value Date    GJN3MCB 26 03/18/2021    FIO2 30.0 03/18/2021         DATA:  Complete Blood Count:   Recent Labs     03/17/21  0548 03/18/21  0412   WBC 8.0 8.2   RBC 4.36 3.53*   HGB 14.4 11.7*   HCT 43.7 35.9*   .2 101.7   MCH 33.0 33.1   MCHC 33.0 32.6   RDW 12.5 13.0    169   MPV 8.8 8.6        Last 3 Blood Glucose:   Recent Labs     03/17/21  0548 03/18/21  0412   GLUCOSE 163* 91        PT/INR:    Lab Results   Component Value Date    PROTIME 10.4 03/17/2021    INR 1.0 03/17/2021     PTT:    Lab Results   Component Value Date    APTT 18.7 03/17/2021       Comprehensive Metabolic Profile:   Recent Labs     03/17/21  0548 03/18/21  0412    140   K 3.5* 3.2*   CL 98 109*   CO2 18* 22   BUN 8 3*   CREATININE 1.00* 0.58   GLUCOSE 163* 91   CALCIUM 9.6 8.2*   PROT 8.0  --    LABALBU 4.3  --    BILITOT 0.64  --    ALKPHOS 109*  --    AST 33*  --    ALT 49*  --       Magnesium:   Lab Results   Component Value Date    MG 2.6 03/18/2021    MG 2.1 03/17/2021     Phosphorus:   Lab Results   Component Value Date    PHOS 2.8 03/18/2021     Ionized Calcium:   Lab Results   Component Value Date    CAION 1.12 03/18/2021        Urinalysis:   Lab Results   Component Value Date    NITRU NEGATIVE 03/17/2021    COLORU YELLOW 03/17/2021    PHUR 5.0 03/17/2021    WBCUA 20 TO 50 03/17/2021    WBCUA 6-10 01/28/2020    RBCUA 2 TO 5 03/17/2021    RBCUA 3-5 01/28/2020    MUCUS NOT REPORTED 03/17/2021    TRICHOMONAS NOT REPORTED 03/17/2021    YEAST NOT REPORTED 03/17/2021    BACTERIA FEW 03/17/2021    SPECGRAV 1.012 03/17/2021    LEUKOCYTESUR NEGATIVE 03/17/2021    UROBILINOGEN Normal 03/17/2021    BILIRUBINUR NEGATIVE 03/17/2021    GLUCOSEU NEGATIVE 03/17/2021    KETUA NEGATIVE 03/17/2021    AMORPHOUS NOT REPORTED 03/17/2021       HgBA1c:  No results found for: LABA1C  TSH:  No results found for: TSH  Lactic Acid: No results found for: LACTA   Troponin: No results for input(s): TROPONINI in the last 72 hours. Radiology/Imaging:   We will follow up this morning    ASSESSMENT:     Patient Active Problem List    Diagnosis Date Noted    Respiratory decompensation 03/17/2021    Attention deficit hyperactivity disorder (ADHD) 08/25/2020    Borderline personality disorder in adult [facility]) 2020    Drug abuse, opioid type ([facility] [address] 2020    Use of nicotine containing substance in combustion-free vaporization device 2020    Elevated LFTs 2020    Admitted to substance misuse detoxification center 2020          PLAN:     Additional assessment:     1. Respiratory failure, overdose        Plan:     Neuro  Sedation, intubated  -Propofol     Cards  Prolonged QTC, 473 on EKG today  -Secondary to trazodone toxicity  -2 g of magnesium given  -Repeat EKG     Pulm  Respiratory failure secondary to trazodone toxicity  -Intubated and sedated  -Vent: Patient changed to pressure support with 30% FiO2 PEEP 5 pressure support above PEEP of 8  -AB.4/39/116/24     Chest x-ray shows ET tube in good position, no pneumonia  -Repeat chest x-ray tomorrow  Patient has spiked fevers overnight, concern for aspiration pneumonia,started on Unasyn     GI  Gastric ulcer prophylaxis  -Pepcid     Nutrition  -N.p.o. for now, start tube feeds        Renal  Mild SUPRIYA  -Baseline creatinine of 0.6  -Creatinine today 0.58  Hypokalemia today of 3.2, will replete  Status post 2 g of magnesium for prolonged QTC, mag 2.6 today. QTc 473  Fluids:  -125 normal saline     Monitor ins and outs     ID  No acute infectious etiology  -We will continue to follow CBC, temperature, white count   -Eval overnight, concern for aspiration pneumonia, on Unasyn  Heme  No acute hematological etiology  -Hemoglobin of 11.7, decreased from 14. No signs of active bleeding, will continue to monitor hemoglobin     Endo  Hyperglycemia  -Likely secondary to stress response  -No history of diabetes  -Goal of glucose less than 180  -Glucose today 91     DVT: Lovenox     Kristamary lousakshi Vinicioe  3/18/2021 0854 AM      Attending Physician Statement  I have discussed the care of Dante Hawkins, including pertinent history and exam findings with the resident.  I have reviewed the key elements of all parts of the

## 2021-03-19 ENCOUNTER — HOSPITAL ENCOUNTER (INPATIENT)
Age: 31
LOS: 6 days | Discharge: HOME | DRG: 817 | End: 2021-03-25
Attending: PSYCHIATRY & NEUROLOGY | Admitting: PSYCHIATRY & NEUROLOGY
Payer: COMMERCIAL

## 2021-03-19 VITALS
HEART RATE: 68 BPM | TEMPERATURE: 98.6 F | RESPIRATION RATE: 23 BRPM | HEIGHT: 64 IN | OXYGEN SATURATION: 97 % | DIASTOLIC BLOOD PRESSURE: 78 MMHG | BODY MASS INDEX: 31.01 KG/M2 | SYSTOLIC BLOOD PRESSURE: 113 MMHG | WEIGHT: 181.66 LBS

## 2021-03-19 PROBLEM — F31.9 BIPOLAR 1 DISORDER (HCC): Status: ACTIVE | Noted: 2021-03-19

## 2021-03-19 PROBLEM — G43.909 MIGRAINE: Status: ACTIVE | Noted: 2021-03-19

## 2021-03-19 PROBLEM — Z86.59 H/O MAJOR DEPRESSION: Status: ACTIVE | Noted: 2021-03-19

## 2021-03-19 PROBLEM — F11.10 HEROIN ABUSE (HCC): Status: ACTIVE | Noted: 2019-04-01

## 2021-03-19 PROBLEM — D64.9 ANEMIA: Status: ACTIVE | Noted: 2021-03-19

## 2021-03-19 PROBLEM — F32.A DEPRESSION: Status: ACTIVE | Noted: 2021-03-19

## 2021-03-19 PROBLEM — F90.9 ATTENTION-DEFICIT HYPERACTIVITY DISORDER: Status: ACTIVE | Noted: 2019-03-05

## 2021-03-19 PROBLEM — Z91.199 NON-COMPLIANCE: Status: ACTIVE | Noted: 2019-04-01

## 2021-03-19 PROBLEM — E87.6 HYPOKALEMIA: Status: ACTIVE | Noted: 2021-03-19

## 2021-03-19 PROBLEM — F41.1 GAD (GENERALIZED ANXIETY DISORDER): Status: ACTIVE | Noted: 2019-04-01

## 2021-03-19 PROBLEM — J69.0 ASPIRATION PNEUMONIA (HCC): Status: ACTIVE | Noted: 2021-03-19

## 2021-03-19 PROBLEM — F17.200 NICOTINE USE DISORDER: Status: ACTIVE | Noted: 2019-04-01

## 2021-03-19 PROBLEM — R45.851 SUICIDAL IDEATIONS: Status: ACTIVE | Noted: 2017-09-01

## 2021-03-19 PROBLEM — R76.8 HEPATITIS C ANTIBODY POSITIVE IN BLOOD: Status: ACTIVE | Noted: 2019-04-04

## 2021-03-19 PROBLEM — R46.81 OBSESSIVE-COMPULSIVE BEHAVIOR: Status: ACTIVE | Noted: 2019-03-05

## 2021-03-19 PROBLEM — F14.90 COCAINE USE: Status: ACTIVE | Noted: 2021-03-19

## 2021-03-19 PROBLEM — F31.64 SEVERE MIXED BIPOLAR I DISORDER WITH PSYCHOTIC FEATURES (HCC): Status: ACTIVE | Noted: 2019-03-31

## 2021-03-19 PROBLEM — B19.20 VIRAL HEPATITIS C WITHOUT HEPATIC COMA: Status: ACTIVE | Noted: 2019-03-05

## 2021-03-19 PROBLEM — T43.211A OVERDOSE OF TRAZODONE: Status: ACTIVE | Noted: 2021-03-19

## 2021-03-19 LAB
ABSOLUTE EOS #: 0.17 K/UL (ref 0–0.44)
ABSOLUTE IMMATURE GRANULOCYTE: <0.03 K/UL (ref 0–0.3)
ABSOLUTE LYMPH #: 2.36 K/UL (ref 1.1–3.7)
ABSOLUTE MONO #: 0.59 K/UL (ref 0.1–1.2)
ALBUMIN SERPL-MCNC: 3 G/DL (ref 3.5–5.2)
ALBUMIN/GLOBULIN RATIO: 1.1 (ref 1–2.5)
ALP BLD-CCNC: 71 U/L (ref 35–104)
ALT SERPL-CCNC: 25 U/L (ref 5–33)
ANION GAP SERPL CALCULATED.3IONS-SCNC: 7 MMOL/L (ref 9–17)
AST SERPL-CCNC: 13 U/L
BASOPHILS # BLD: 0 % (ref 0–2)
BASOPHILS ABSOLUTE: <0.03 K/UL (ref 0–0.2)
BILIRUB SERPL-MCNC: 0.26 MG/DL (ref 0.3–1.2)
BUN BLDV-MCNC: 4 MG/DL (ref 6–20)
BUN/CREAT BLD: ABNORMAL (ref 9–20)
CALCIUM IONIZED: 1.14 MMOL/L (ref 1.13–1.33)
CALCIUM SERPL-MCNC: 8.3 MG/DL (ref 8.6–10.4)
CHLORIDE BLD-SCNC: 109 MMOL/L (ref 98–107)
CO2: 27 MMOL/L (ref 20–31)
CREAT SERPL-MCNC: 0.51 MG/DL (ref 0.5–0.9)
DIFFERENTIAL TYPE: ABNORMAL
EOSINOPHILS RELATIVE PERCENT: 2 % (ref 1–4)
GFR AFRICAN AMERICAN: >60 ML/MIN
GFR NON-AFRICAN AMERICAN: >60 ML/MIN
GFR SERPL CREATININE-BSD FRML MDRD: ABNORMAL ML/MIN/{1.73_M2}
GFR SERPL CREATININE-BSD FRML MDRD: ABNORMAL ML/MIN/{1.73_M2}
GLUCOSE BLD-MCNC: 105 MG/DL (ref 70–99)
HCT VFR BLD CALC: 33.4 % (ref 36.3–47.1)
HEMOGLOBIN: 11 G/DL (ref 11.9–15.1)
IMMATURE GRANULOCYTES: 0 %
LYMPHOCYTES # BLD: 30 % (ref 24–43)
MAGNESIUM: 2 MG/DL (ref 1.6–2.6)
MCH RBC QN AUTO: 33.5 PG (ref 25.2–33.5)
MCHC RBC AUTO-ENTMCNC: 32.9 G/DL (ref 28.4–34.8)
MCV RBC AUTO: 101.8 FL (ref 82.6–102.9)
MONOCYTES # BLD: 8 % (ref 3–12)
NRBC AUTOMATED: 0 PER 100 WBC
PDW BLD-RTO: 13 % (ref 11.8–14.4)
PHOSPHORUS: 2.4 MG/DL (ref 2.6–4.5)
PLATELET # BLD: ABNORMAL K/UL (ref 138–453)
PLATELET ESTIMATE: ABNORMAL
PLATELET, FLUORESCENCE: 174 K/UL (ref 138–453)
PLATELET, IMMATURE FRACTION: 0.8 % (ref 1.1–10.3)
PMV BLD AUTO: ABNORMAL FL (ref 8.1–13.5)
POTASSIUM SERPL-SCNC: 3.4 MMOL/L (ref 3.7–5.3)
POTASSIUM SERPL-SCNC: 3.8 MMOL/L (ref 3.7–5.3)
RBC # BLD: 3.28 M/UL (ref 3.95–5.11)
RBC # BLD: ABNORMAL 10*6/UL
SEG NEUTROPHILS: 60 % (ref 36–65)
SEGMENTED NEUTROPHILS ABSOLUTE COUNT: 4.66 K/UL (ref 1.5–8.1)
SODIUM BLD-SCNC: 143 MMOL/L (ref 135–144)
TOTAL PROTEIN: 5.8 G/DL (ref 6.4–8.3)
WBC # BLD: 7.8 K/UL (ref 3.5–11.3)
WBC # BLD: ABNORMAL 10*3/UL

## 2021-03-19 PROCEDURE — 6370000000 HC RX 637 (ALT 250 FOR IP): Performed by: STUDENT IN AN ORGANIZED HEALTH CARE EDUCATION/TRAINING PROGRAM

## 2021-03-19 PROCEDURE — 97530 THERAPEUTIC ACTIVITIES: CPT

## 2021-03-19 PROCEDURE — 84100 ASSAY OF PHOSPHORUS: CPT

## 2021-03-19 PROCEDURE — 36415 COLL VENOUS BLD VENIPUNCTURE: CPT

## 2021-03-19 PROCEDURE — 2580000003 HC RX 258: Performed by: STUDENT IN AN ORGANIZED HEALTH CARE EDUCATION/TRAINING PROGRAM

## 2021-03-19 PROCEDURE — 6360000002 HC RX W HCPCS: Performed by: STUDENT IN AN ORGANIZED HEALTH CARE EDUCATION/TRAINING PROGRAM

## 2021-03-19 PROCEDURE — 80053 COMPREHEN METABOLIC PANEL: CPT

## 2021-03-19 PROCEDURE — 85025 COMPLETE CBC W/AUTO DIFF WBC: CPT

## 2021-03-19 PROCEDURE — 97535 SELF CARE MNGMENT TRAINING: CPT

## 2021-03-19 PROCEDURE — 97162 PT EVAL MOD COMPLEX 30 MIN: CPT

## 2021-03-19 PROCEDURE — 93005 ELECTROCARDIOGRAM TRACING: CPT | Performed by: STUDENT IN AN ORGANIZED HEALTH CARE EDUCATION/TRAINING PROGRAM

## 2021-03-19 PROCEDURE — 97166 OT EVAL MOD COMPLEX 45 MIN: CPT

## 2021-03-19 PROCEDURE — 85055 RETICULATED PLATELET ASSAY: CPT

## 2021-03-19 PROCEDURE — 1240000000 HC EMOTIONAL WELLNESS R&B

## 2021-03-19 PROCEDURE — 99253 IP/OBS CNSLTJ NEW/EST LOW 45: CPT | Performed by: PSYCHIATRY & NEUROLOGY

## 2021-03-19 PROCEDURE — 82330 ASSAY OF CALCIUM: CPT

## 2021-03-19 PROCEDURE — 99232 SBSQ HOSP IP/OBS MODERATE 35: CPT | Performed by: INTERNAL MEDICINE

## 2021-03-19 PROCEDURE — 6370000000 HC RX 637 (ALT 250 FOR IP): Performed by: PSYCHIATRY & NEUROLOGY

## 2021-03-19 PROCEDURE — 83735 ASSAY OF MAGNESIUM: CPT

## 2021-03-19 PROCEDURE — 84132 ASSAY OF SERUM POTASSIUM: CPT

## 2021-03-19 RX ORDER — ACETAMINOPHEN 325 MG/1
650 TABLET ORAL EVERY 4 HOURS PRN
Status: DISCONTINUED | OUTPATIENT
Start: 2021-03-19 | End: 2021-03-25 | Stop reason: HOSPADM

## 2021-03-19 RX ORDER — AMOXICILLIN AND CLAVULANATE POTASSIUM 875; 125 MG/1; MG/1
1 TABLET, FILM COATED ORAL EVERY 12 HOURS SCHEDULED
Status: DISPENSED | OUTPATIENT
Start: 2021-03-19 | End: 2021-03-23

## 2021-03-19 RX ORDER — TRAZODONE HYDROCHLORIDE 50 MG/1
50 TABLET ORAL NIGHTLY PRN
Status: DISCONTINUED | OUTPATIENT
Start: 2021-03-19 | End: 2021-03-25 | Stop reason: HOSPADM

## 2021-03-19 RX ORDER — AMOXICILLIN AND CLAVULANATE POTASSIUM 875; 125 MG/1; MG/1
1 TABLET, FILM COATED ORAL EVERY 12 HOURS SCHEDULED
Status: DISCONTINUED | OUTPATIENT
Start: 2021-03-19 | End: 2021-03-19 | Stop reason: HOSPADM

## 2021-03-19 RX ORDER — POLYETHYLENE GLYCOL 3350 17 G/17G
17 POWDER, FOR SOLUTION ORAL DAILY PRN
Status: DISCONTINUED | OUTPATIENT
Start: 2021-03-19 | End: 2021-03-25 | Stop reason: HOSPADM

## 2021-03-19 RX ORDER — MAGNESIUM HYDROXIDE/ALUMINUM HYDROXICE/SIMETHICONE 120; 1200; 1200 MG/30ML; MG/30ML; MG/30ML
30 SUSPENSION ORAL EVERY 6 HOURS PRN
Status: DISCONTINUED | OUTPATIENT
Start: 2021-03-19 | End: 2021-03-25 | Stop reason: HOSPADM

## 2021-03-19 RX ORDER — POTASSIUM CHLORIDE 20 MEQ/1
40 TABLET, EXTENDED RELEASE ORAL EVERY 4 HOURS
Status: DISCONTINUED | OUTPATIENT
Start: 2021-03-19 | End: 2021-03-19 | Stop reason: HOSPADM

## 2021-03-19 RX ORDER — LINEZOLID 600 MG/1
600 TABLET, FILM COATED ORAL 2 TIMES DAILY
Qty: 28 TABLET | Refills: 0 | Status: ON HOLD | OUTPATIENT
Start: 2021-03-19 | End: 2021-03-24 | Stop reason: HOSPADM

## 2021-03-19 RX ORDER — IBUPROFEN 400 MG/1
400 TABLET ORAL EVERY 6 HOURS PRN
Status: DISCONTINUED | OUTPATIENT
Start: 2021-03-19 | End: 2021-03-25 | Stop reason: HOSPADM

## 2021-03-19 RX ORDER — HYDROXYZINE 50 MG/1
50 TABLET, FILM COATED ORAL 3 TIMES DAILY PRN
Status: DISCONTINUED | OUTPATIENT
Start: 2021-03-19 | End: 2021-03-25 | Stop reason: HOSPADM

## 2021-03-19 RX ADMIN — SODIUM CHLORIDE, PRESERVATIVE FREE 10 ML: 5 INJECTION INTRAVENOUS at 09:35

## 2021-03-19 RX ADMIN — AMPICILLIN SODIUM AND SULBACTAM SODIUM 3000 MG: 2; 1 INJECTION, POWDER, FOR SOLUTION INTRAMUSCULAR; INTRAVENOUS at 04:38

## 2021-03-19 RX ADMIN — AMPICILLIN SODIUM AND SULBACTAM SODIUM 3000 MG: 2; 1 INJECTION, POWDER, FOR SOLUTION INTRAMUSCULAR; INTRAVENOUS at 10:47

## 2021-03-19 RX ADMIN — AMOXICILLIN AND CLAVULANATE POTASSIUM 1 TABLET: 875; 125 TABLET, FILM COATED ORAL at 22:14

## 2021-03-19 RX ADMIN — POTASSIUM CHLORIDE 40 MEQ: 1500 TABLET, EXTENDED RELEASE ORAL at 11:58

## 2021-03-19 RX ADMIN — FAMOTIDINE 20 MG: 20 TABLET, FILM COATED ORAL at 09:35

## 2021-03-19 RX ADMIN — POTASSIUM CHLORIDE 40 MEQ: 1500 TABLET, EXTENDED RELEASE ORAL at 09:35

## 2021-03-19 RX ADMIN — ENOXAPARIN SODIUM 40 MG: 40 INJECTION SUBCUTANEOUS at 09:35

## 2021-03-19 ASSESSMENT — SLEEP AND FATIGUE QUESTIONNAIRES
DIFFICULTY STAYING ASLEEP: YES
RESTFUL SLEEP: NO

## 2021-03-19 ASSESSMENT — PAIN DESCRIPTION - ORIENTATION: ORIENTATION: MID

## 2021-03-19 ASSESSMENT — PAIN DESCRIPTION - FREQUENCY: FREQUENCY: CONTINUOUS

## 2021-03-19 ASSESSMENT — PAIN DESCRIPTION - PAIN TYPE: TYPE: ACUTE PAIN

## 2021-03-19 ASSESSMENT — PATIENT HEALTH QUESTIONNAIRE - PHQ9: SUM OF ALL RESPONSES TO PHQ QUESTIONS 1-9: 22

## 2021-03-19 ASSESSMENT — PAIN DESCRIPTION - DESCRIPTORS: DESCRIPTORS: ACHING;DISCOMFORT

## 2021-03-19 ASSESSMENT — PAIN DESCRIPTION - LOCATION
LOCATION: CHEST
LOCATION: CHEST

## 2021-03-19 ASSESSMENT — PAIN DESCRIPTION - ONSET: ONSET: ON-GOING

## 2021-03-19 NOTE — PROGRESS NOTES
Report called to [facility], [place]. All questions answered. Pt educated about transfer to [facility]. IV access removed and dressing applied. Pt personal belongings gathered.    Electronically signed by Willene Bence, RN on 3/19/2021 at 5:31 PM

## 2021-03-19 NOTE — CARE COORDINATION
TRANSITIONAL CARE PLANNING/ 2 Rehab [place] Day: 2    Reason for Admission: Respiratory decompensation [J98.8]     Treatment Plan of Care: Suicide precautions and sitter in place,     Tests/Procedures still needed: on IV atnx, daily labs    Barriers to Discharge: med clearance, to have psych evaluation today    Readmission Risk              Risk of Unplanned Readmission:        25          Preferences/Transitional Plan: plan is psych eval today, possible [facility] admission, on iv antx, SW following

## 2021-03-19 NOTE — PROGRESS NOTES
[facility]  Internal Medicine Teaching Residency Program  Inpatient Daily Progress Note  ______________________________________________________________________________    Patient: Patrick Gee  YOB: 1990   TPL:[id number]    Acct: [de-identified]     Room: [address] [place] [address]  Admit date: 3/17/2021  Today's date: 03/19/21  Number of days in the hospital: 2    SUBJECTIVE   Admitting Diagnosis: Overdose of trazodone  CC: Respiratory failure, AMS, overdose  Pt examined at bedside. Chart & results reviewed. Hemodynamically stable. Afebrile. /65, HR 92, saturating well on room air. Suicide precautions and sitter in place. We will get psych consult. ROS:  Constitutional:  negative for chills, fevers, sweats  Respiratory:  negative for cough, dyspnea on exertion, hemoptysis, shortness of breath, wheezing  Cardiovascular:  negative for chest pain, chest pressure/discomfort, lower extremity edema, palpitations  Gastrointestinal:  negative for abdominal pain, constipation, diarrhea, nausea, vomiting  Neurological:  negative for dizziness, headache  BRIEF HISTORY     34-year-old male with history of ADD, drug abuse, borderline disorder presented to the ED with altered state secondary to trazodone overdose. Patient was having shortness of breath and was intubated and taken to the ICU due to respiratory for repair and to protect airway. Patient had prolonged QRS on EKG. Poison control was contacted. Patient was having metabolic acidosis and was managed in the ICU. Patient was extubated yesterday as is transferred to the floor. Patient is hemodynamically stable and is saturating well on room air. Suicide precautions and sitter in place. Will get psych consult.     OBJECTIVE     Vital Signs:  /65   Pulse 92   Temp 98.8 °F (37.1 °C) (Axillary)   Resp 30   Ht 5' 4\" (1.626 m)   Wt 181 lb 10.5 oz (82.4 kg)   SpO2 95%   BMI 31.18 kg/m²     Temp (24hrs), Av.9 °F (37.7 °C), Min:98.8 °F (37.1 °C), Max:100.9 °F (38.3 °C)    In: 1097   Out: 250 [Urine:250]    Physical Exam:  General appearance - alert, in no distress  Mental status - alert, oriented to person, place, and time, drowsy  Neck - supple  Chest - clear to auscultation, no wheezes  Heart - normal rate, regular rhythm, normal S1, S2  Abdomen - soft, nontender, nondistended  Neurological - alert, oriented, normal speech, no focal deficits   Extremities - peripheral pulses normal, no pedal edema  Skin - normal coloration and turgor, no rashes        Medications:  Scheduled Medications:    potassium bicarb-citric acid  40 mEq Oral Daily    ampicillin-sulbactam  3,000 mg Intravenous Q6H    sodium chloride flush  10 mL Intravenous 2 times per day    enoxaparin  40 mg Subcutaneous Daily    famotidine  20 mg Oral BID     Continuous Infusions:   PRN Medicationsnaloxone, 2 mg, PRN  sodium chloride flush, 10 mL, PRN  polyethylene glycol, 17 g, Daily PRN  acetaminophen, 650 mg, Q6H PRN    Or  acetaminophen, 650 mg, Q6H PRN  albuterol, 2.5 mg, As Directed RT PRN        Diagnostic Labs:  CBC:   Recent Labs     21  0548 21  0412   WBC 8.0 8.2   RBC 4.36 3.53*   HGB 14.4 11.7*   HCT 43.7 35.9*   .2 101.7   RDW 12.5 13.0    169     BMP:   Recent Labs     21  0548 21  0412    140   K 3.5* 3.2*   CL 98 109*   CO2 18* 22   PHOS  --  2.8   BUN 8 3*   CREATININE 1.00* 0.58     BNP: No results for input(s): BNP in the last 72 hours. PT/INR:   Recent Labs     21   PROTIME 10.4   INR 1.0     APTT:   Recent Labs     21   APTT 18.7*     CARDIAC ENZYMES: No results for input(s): CKMB, CKMBINDEX, TROPONINI in the last 72 hours.     Invalid input(s): CKTOTAL;3  FASTING LIPID PANEL:No results found for: CHOL, HDL, TRIG  LIVER PROFILE:   Recent Labs     21  0548   AST 33*   ALT 49*   BILITOT 0.64   ALKPHOS 109*      MICROBIOLOGY:   Lab Results   Component

## 2021-03-19 NOTE — CARE COORDINATION
Spoke with Radha Smith in the [place] at [facility], they have bed availability. PS by nurse to MD to see who will be speaking with [facility] physician for transfer. Covid test complete on 3/17 will verify if need repeat. 1626 Call to [facility], number given for Dr Ana Matthew [phone], Spoke with Johana Meredith) at [facility], above information provided. [address] Dr Ana Matthew to floor, connected with [facility] and HUB/psychiatrist at [facility].      [address] complete and faxed to [facility], awaiting signature of attending on [address], [facility] complete, transport paperwork to  [address] on will call, awaiting room number and acceptance from [facility] Patient will be going to room 112-2, report number is [phone], [facility] notified and transport will be 630-700pm, RN provided with room and report number    Discharge Report    [facility] Case Management Department  Written by: Fitz Jones RN    Patient Name: Yina Duorn  Attending Provider: Carie Molina MD  Admit Date: 3/17/2021  5:13 AM  MRN: [id number]  Account: [de-identified]                     : 1990  Discharge Date: 3/19/2021      Disposition: [facility] pink slip    Fitz Jones RN

## 2021-03-19 NOTE — VIRTUAL HEALTH
·    Obsessions and Compulsions: Denies    ·    Rosalind or Hypomania: Denies  ·    Hallucinations: Denies  ·    Panic Attacks:  Denies  ·    Delusions:  Denies  ·    Phobias:  Denies  ·    Trauma: Denies    Prior to Admission medications    Medication Sig Start Date End Date Taking?  Authorizing Provider   cariprazine hcl (VRAYLAR) 1.5 MG capsule Take 1.5 mg by mouth daily    Historical Provider, MD   busPIRone (BUSPAR) 10 MG tablet Take 5 mg by mouth 2 times daily    Historical Provider, MD   buPROPion (WELLBUTRIN XL) 300 MG extended release tablet 1 tablet in the morning    Historical Provider, MD   vilazodone HCl (VILAZODONE HCL) 20 MG TABS Take 20 mg by mouth daily    Historical Provider, MD   guanFACINE (INTUNIV) 2 MG TB24 extended release tablet Take 2 mg by mouth daily    Historical Provider, MD   lamoTRIgine (LAMICTAL) 100 MG tablet Take 50 mg by mouth 2 times daily     Historical Provider, MD   traZODone (DESYREL) 100 MG tablet Take 150 mg by mouth nightly     Historical Provider, MD        Medications:    Current Facility-Administered Medications: potassium chloride (KLOR-CON M) extended release tablet 40 mEq, 40 mEq, Oral, Q4H  amoxicillin-clavulanate (AUGMENTIN) 875-125 MG per tablet 1 tablet, 1 tablet, Oral, 2 times per day  naloxone (NARCAN) injection 2 mg, 2 mg, Nasal, PRN  sodium chloride flush 0.9 % injection 10 mL, 10 mL, Intravenous, 2 times per day  sodium chloride flush 0.9 % injection 10 mL, 10 mL, Intravenous, PRN  enoxaparin (LOVENOX) injection 40 mg, 40 mg, Subcutaneous, Daily  polyethylene glycol (GLYCOLAX) packet 17 g, 17 g, Oral, Daily PRN  acetaminophen (TYLENOL) tablet 650 mg, 650 mg, Oral, Q6H PRN **OR** acetaminophen (TYLENOL) suppository 650 mg, 650 mg, Rectal, Q6H PRN  albuterol (PROVENTIL) nebulizer solution 2.5 mg, 2.5 mg, Nebulization, As Directed RT PRN     Past Medical History:        Diagnosis Date    Anxiety     Aspiration pneumonia ([facility] 3/19/2021    [facility]     Depression     Disease of blood and blood forming organ     Drug use     Hepatitis C     History of iron deficiency anemia     Liver disease        Past Surgical History:        Procedure Laterality Date    APPENDECTOMY      APPENDECTOMY Right        Allergies: Aspirin, Codeine, and Ibuprofen      Social History:      RESIDENCE: homeless  : denies    CHILDREN: denies  OCCUPATION: unemployed   EDUCATION: HS grad    SUBSTANCE USE HISTORY: fentanyl, extensive, says yes to etoh, heroin, acid, LSD, PCP, mushrooms        Family Medical and Psychiatric History:     Noncontributory        Problem Relation Age of Onset    No Known Problems Mother         no health problems    No Known Problems Father         no health problems          Physical  /70   Pulse 75   Temp 98.3 °F (36.8 °C) (Oral)   Resp 20   Ht 5' 4\" (1.626 m)   Wt 181 lb 10.5 oz (82.4 kg)   SpO2 96%   BMI 31.18 kg/m²         Mental Status Examination:  Level of consciousness:  Within normal limits  Appearance: hospital attire, lying in bed, fair grooming  Behavior/Motor:  no abnormalities noted  Attitude toward examiner:  cooperative, attentive and good eye contact  Speech:  Spontaneous, normal rate and volume  Mood: Depressed  Affect: mood congruent  Thought processes:  Linear, goal directed, coherent  Thought content: Endorses suicidal ideations   Denies homicidal ideations    Denies hallucinations   Denies delusions  Cognition:  Oriented to self, situation, location, date  Concentration mildly impaired  Memory is difficult to assess due to limited participation however she does appear to demonstrate appropriate memory per her limited participation  Insight & Judgment: Poor  DSM-5 DIAGNOSIS:      Recurrent severe major depression without psychosis  Opiate use disorder  Polysubstance use disorder (alcohol, marijuana, mushrooms, ecstasy, LSD, acid, PCP, cocaine, crack)    Stressors     Prominent stressors leading to suicide attempt including homelessness, relapse, and mood    PLAN:      Recommend the patient be admitted to the [facility] once medically cleared  Additional recommendations will follow the clinical course. Thank you very much for allowing us to participate in the care of this patient. Electronically signed by Alis Hi MD on 3/19/21 at 2:13 PM EDT        Patient Location:  [address] Stepdown    Provider Location (City/State):   [facility]    This virtual visit was conducted via interactive/real-time audio/video.

## 2021-03-19 NOTE — CARE COORDINATION
Met with pt after receiving a social work consult from [facility] for being homeless. Pt was alert and oriented. She states that she has been staying with an ex girlfriend but that is no longer an option. She states that she does not have any family or other friends to stay with at this time. Asked about pt's substance abuse and she states that she has been using fentanyl daily. She was previously in [address] [place] [address] [employer] but she states that she is not allowed back. Provided a list of drug rehab information.   Will follow up after pt has psych eval.

## 2021-03-19 NOTE — PROGRESS NOTES
[address] [place] [address]     Department of Internal Medicine - Staff Internal Medicine Service   ICU PATIENT TRANSFER NOTE        Patient:  Christiano Ram  YOB: 1990  MRN: [id number]     Acct: [de-identified]     Admit date: 3/17/2021    Code Status:-  Full code     Reason for ICU Admission:-       SUPPORT DEVICES: [] Ventilator [] BIPAP  [] Nasal Cannula [x] Room Air    Consultations:- [] Cardiology [] Nephrology  [] Hemo onco  [] GI                               [] ID [] ENT  [] Rheum [] Endo   []Physiotherapy                                 Others:-     NUTRITION:  [] NPO [] Tube Feeding (Specify: ) [] TPN  [x] PO    Central Lines:- [x] No   [] Yes           If yes - Days/Date of Insertion. Pt seen,examined and Chart reviewed. ICU COURSE:    Patient presented due to concerns for intentional trazodone overdose. Patient noted to have decreased consciousness and widened QRS in the emergency department. Patient received 2 g of mag and was intubated for airway protection and admitted to the ICU. Patient was febrile overnight, given concerns for aspiration pneumonia patient was started on Unasyn every 6. Patient was extubated today and tolerated it well. Patient passed swallow study at bedside and diet was advanced to general. 1-1 sitter at bedside due to intentional overdose of trazodone. Currently patient is seen on the floor. Patient is lying comfortably on the bed. /49, heart rate 95, saturating 96% room air. Patient states that she is feeling okay but is still feeling tired. Patient denies any chest pain, shortness of breath, abdominal pain, palpitations or any suicidal ideations. Patient states she had a bowel movement today and denies any diarrhea or constipation.     Physical Exam:   Vitals: /61   Pulse 89   Temp 99.4 °F (37.4 °C) (Oral)   Resp 21   Ht 5' 4\" (1.626 m)   Wt 179 lb 14.3 oz (81.6 kg)   SpO2 100%   BMI 30.88 kg/m²   24 hour intake/output: Intake/Output Summary (Last 24 hours) at 3/18/2021 2127  Last data filed at 3/18/2021 1627  Gross per 24 hour   Intake 1556 ml   Output 1840 ml   Net -284 ml     Last 3 weights: Wt Readings from Last 3 Encounters:   03/18/21 179 lb 14.3 oz (81.6 kg)   11/10/20 191 lb (86.6 kg)   09/21/20 195 lb (88.5 kg)       General appearance - alert, in no distress  Mental status - alert, oriented to person, place, and time, drowsy  Neck - supple  Chest - clear to auscultation, no wheezes  Heart - normal rate, regular rhythm, normal S1, S2  Abdomen - soft, nontender, nondistended  Neurological - alert, oriented, normal speech, no focal deficits   Extremities - peripheral pulses normal, no pedal edema  Skin - normal coloration and turgor, no rashes      Medications:Current Inpatient  Scheduled Meds:   potassium bicarb-citric acid  40 mEq Oral BID    ampicillin-sulbactam  3,000 mg Intravenous Q6H    sodium chloride flush  10 mL Intravenous 2 times per day    enoxaparin  40 mg Subcutaneous Daily    famotidine  20 mg Oral BID     Continuous Infusions:  PRN Meds:naloxone, sodium chloride flush, [Held by provider] promethazine **OR** [Held by provider] ondansetron, polyethylene glycol, acetaminophen **OR** acetaminophen, fentanNYL, albuterol    Objective:    CBC:   Recent Labs     03/17/21  0548 03/18/21  0412   WBC 8.0 8.2   HGB 14.4 11.7*    169     BMP:    Recent Labs     03/17/21  0548 03/18/21  0412    140   K 3.5* 3.2*   CL 98 109*   CO2 18* 22   BUN 8 3*   CREATININE 1.00* 0.58   GLUCOSE 163* 91     Calcium:  Recent Labs     03/18/21  0412   CALCIUM 8.2*     Ionized Calcium:No results for input(s): IONCA in the last 72 hours. Magnesium:  Recent Labs     03/18/21  0412   MG 2.6     Phosphorus:  Recent Labs     03/18/21  0412   PHOS 2.8     BNP:No results for input(s): BNP in the last 72 hours.   Glucose:  Recent Labs     03/17/21  0518 03/18/21  0428   POCGLU 132* 100     HgbA1C: No results for input(s): LABA1C in the last 72 hours. INR:   Recent Labs     03/17/21  0548   INR 1.0     Hepatic:   Recent Labs     03/17/21  0548   ALKPHOS 109*   ALT 49*   AST 33*   PROT 8.0   BILITOT 0.64   LABALBU 4.3     Amylase and Lipase:No results for input(s): LACTA, AMYLASE in the last 72 hours. Lactic Acid: No results for input(s): LACTA in the last 72 hours. CARDIAC ENZYMES:  Recent Labs     03/17/21  0548   CKTOTAL 73     BNP: No results for input(s): BNP in the last 72 hours. Lipids: No results for input(s): CHOL, TRIG, HDL, LDLCALC in the last 72 hours. Invalid input(s): LDL  ABGs: No results found for: PH, PCO2, PO2, HCO3, O2SAT  Thyroid: No results found for: TSH     Urinalysis:   Recent Labs     03/17/21  0631   BACTERIA FEW*   COLORU YELLOW   PHUR 5.0   PROTEINU TRACE*   RBCUA 2 TO 5   SPECGRAV 1.012   BILIRUBINUR NEGATIVE   NITRU NEGATIVE   WBCUA 20 TO 50   LEUKOCYTESUR NEGATIVE   GLUCOSEU NEGATIVE           Assessment:  Active Problems:    Respiratory decompensation  Resolved Problems:    * No resolved hospital problems.  *          Plan:     Respiratory failure secondary to trazodone toxicity  -Patient was extubated this morning  -Saturating well on room air  -Patient tolerating feeds well  -We will get psych consult    Aspiration pneumonia  -Patient continued on Unasyn    SUPRIYA -resolved    DVT prophylaxis: Lovenox  GI prophylaxis: Pepcid    PT/OT/SW: Consulted  Discharge planning:  consulted      Brittaney Burgos MD, PGY-1            Department of Internal Medicine  [address] [facility]           3/18/2021, 9:27 PM

## 2021-03-19 NOTE — PROGRESS NOTES
return  REQUIRES OT FOLLOW UP: Yes  Activity Tolerance  Activity Tolerance: Patient limited by pain; Patient Tolerated treatment well  Safety Devices  Safety Devices in place: Yes  Type of devices: Gait belt;Call light within reach; Bed alarm in place; Left in bed(sitter present)  Restraints  Initially in place: No         Patient Diagnosis(es): The encounter diagnosis was Overdose of trazodone. has a past medical history of Anxiety, Aspiration pneumonia ([facility] Borderline personality disorder ([facility] Depression, Disease of blood and blood forming organ, Drug use, Hepatitis C, History of iron deficiency anemia, and Liver disease. has a past surgical history that includes Appendectomy and Appendectomy (Right). Restrictions  Restrictions/Precautions  Restrictions/Precautions: Up as Tolerated, Fall Risk  Required Braces or Orthoses?: No  Position Activity Restriction  Other position/activity restrictions: suicide precautions    Subjective   General  Patient assessed for rehabilitation services?: Yes  Family / Caregiver Present: No  General Comment  Comments: RN ok'd patient for OT/PT evaluation. Pt pleasant and cooperative throughout. Patient Currently in Pain: Yes  Pain Assessment  Pain Assessment: 0-10  Pain Level: 4  Pain Type: Acute pain  Pain Location: Chest  Pain Orientation: Mid  Pain Descriptors: Aching;Discomfort  Pain Frequency: Continuous  Pain Onset: On-going  Non-Pharmaceutical Pain Intervention(s): Repositioned; Ambulation/Increased Activity  Response to Pain Intervention: Patient Satisfied  Vital Signs  Patient Currently in Pain: Yes    Social/Functional History  Social/Functional History  Lives With: Alone  Type of Home: Homeless  ADL Assistance: Independent  Homemaking Assistance: Independent  Homemaking Responsibilities: Yes  Meal Prep Responsibility: Primary  Laundry Responsibility: Primary  Cleaning Responsibility: Primary  Shopping Responsibility: Primary  Ambulation Assistance: Independent Transfer Assistance: Independent  Active : No  Mode of Transportation: Walk  Leisure & Hobbies: Music  Additional Comments: Pt reports typically having to negotiate full flight of stairs       Objective   Vision: Impaired  Vision Exceptions: Wears glasses at all times  Hearing: Within functional limits    Orientation  Overall Orientation Status: Within Functional Limits     Balance  Sitting Balance: Stand by assistance  Standing Balance: Contact guard assistance  Standing Balance  Time: 2-3 min  Activity: EOB static  Comment: using RW  Functional Mobility  Functional - Mobility Device: Rolling Walker  Activity: Other  Assist Level: Contact guard assistance  Functional Mobility Comments: household distances within room  ADL  Feeding: Independent;Setup  Grooming: Independent(OT facilitated combing hair sitting EOB)  UE Bathing: Stand by assistance; Increased time to complete(OT facilitated washing hair sitting EOB using cap at SBA with increased time and VCs for use)  LE Bathing: Contact guard assistance  UE Dressing: Stand by assistance(OT facilitated donning gown on backside at SBA with VCs, pt doffed at end of session)  LE Dressing: Contact guard assistance(OT facilitated donning undergarments sitting EOB with set up)  Toileting: Stand by assistance  Tone RUE  RUE Tone: Normotonic  Tone LUE  LUE Tone: Normotonic  Coordination  Movements Are Fluid And Coordinated: Yes     Bed mobility  Supine to Sit: Stand by assistance  Sit to Supine: Stand by assistance  Scooting: Stand by assistance  Transfers  Sit to stand: Minimal assistance  Stand to sit: Contact guard assistance     Cognition  Overall Cognitive Status: Exceptions  Arousal/Alertness: Appropriate responses to stimuli;Delayed responses to stimuli  Initiation: Requires cues for some  Sequencing: Requires cues for some        Sensation  Overall Sensation Status: WFL      LUE AROM : WFL  Left Hand AROM: WFL  RUE AROM : WFL  Right Hand AROM: WFL  LUE Strength Gross LUE Strength: Exceptions to [facility]  L Shoulder Flex: 4+/5  L Elbow Flex: 4+/5  L Elbow Ext: 4+/5  L Hand General: 4+/5  RUE Strength  Gross RUE Strength: Exceptions to [facility]  R Shoulder Flex: 4+/5  R Elbow Flex: 4+/5  R Elbow Ext: 4+/5  R Hand General: 4+/5              Plan   Plan  Times per week: 3x/wk  Current Treatment Recommendations: Equipment Evaluation, Education, & procurement, Self-Care / ADL, Safety Education & Training, Functional Mobility Training, Balance Training, Endurance Training, Strengthening, Patient/Caregiver Education & Training    AM-PAC Score        AM-PAC Inpatient Daily Activity Raw Score: 20 (03/19/21 1147)  AM-PAC Inpatient ADL T-Scale Score : 42.03 (03/19/21 1147)  ADL Inpatient CMS 0-100% Score: 38.32 (03/19/21 1147)  ADL Inpatient CMS G-Code Modifier : Oval Mayo (03/19/21 1147)    Goals  Short term goals  Time Frame for Short term goals: Patient will, by discharge  Short term goal 1: demo ADLs at [facility]  Short term goal 2: demo functional transfers/mobility at [facility] to engage in ADLs safely  Short term goal 3: engage in leisure checklist to identify 2+ leisure activities to increase quality of life and engagement in leisure tasks  Short term goal 4: demo 25+ min of functional activity tolerance to increase safety with ADL tasks     Therapy Time   Individual Concurrent Group Co-treatment   Time In 1004         Time Out 1026         Minutes 22         Timed Code Treatment Minutes: 9 Minutes     Greg Mora, OTR/L

## 2021-03-19 NOTE — PLAN OF CARE
Problem: SKIN INTEGRITY  Goal: Skin integrity is maintained or improved  Outcome: Ongoing  Note: Pts skin maintains structural intactness and physiologic function. Pt able to reposition independently in bed/ Assisting pt with turns every 2 hours and heels elevated off bed. Linens remain clean and dry. Will continue to monitor.

## 2021-03-19 NOTE — PROGRESS NOTES
Came in with trazodone overdose which was intentional, took about 60 tablets of trazodone. Intubated for airway protection. Finally extubated on 3/18/2021. Patient has low-grade fever with slight tachypnea, rest of the vitals within normal limits. Patient on room air and saturating well. Labs reviewed  Hypokalemia 3.2  Total CK 73, proBNP 38, tropes remain negative  LFTs slightly elevated with ALT 49 AST 33,   UDS positive for cocaine and benzos  Tylenol and salicylate negative  Anemia 11.7  PT/INR within normal limits  UA few bacteriuria [id number]  Blood culturesnegative  Respiratory culturesinadequate sample will repeat  Urine culture negative  Covid negative  Chest x-ray 3/18/2021normal      Active Problems:    Admitted to substance misuse detoxification center    Attention-deficit hyperactivity disorder    Heroin abuse ([facility] [address]    Elevated LFTs    Respiratory decompensation    Bipolar 1 disorder (HCC)    Depression    Nicotine use disorder    Non-compliance    Suicidal ideations    Aspiration pneumonia (HCC)    Hypokalemia    Cocaine use    Anemia    Overdose of trazodone  Resolved Problems:    * No resolved hospital problems. *      Plan  · Tylenol for fever  · Pepcid for GERD  · Lovenox for DVT prophylaxis  · Suicide precaution, psych consult  · Trazodone intentional overdosewe will treat symptomatically. · Continue Unasyn for aspiration pneumonia, follow-up on cultures. · Follow-up in bedside swallow, if passes, start diet. · Psych consult, suicide precaution, likely will go to [facility]. Patient agreeable today.       Rafael Pike MD  Internal Medicine Resident  [address] [facility]  3/19/2021 3:00 AM

## 2021-03-19 NOTE — PROGRESS NOTES
Pharmacy Note  Stress Ulcer Prophylaxis Discontinuation    Leonard Jordan is a 31 y.o. female. Pharmacist assessment of stress ulcer prophylaxis therapy. Patient Active Problem List   Diagnosis    Admitted to substance misuse detoxification center    Attention-deficit hyperactivity disorder    Borderline personality disorder in adult [facility])    Heroin abuse ([facility] [address]    Use of nicotine containing substance in combustion-free vaporization device    Elevated LFTs    Respiratory decompensation    Bipolar 1 disorder (HCC)    Depression    GAMALIEL (generalized anxiety disorder)    Hepatitis C antibody positive in blood    Migraine    Nicotine use disorder    Non-compliance    Obsessive-compulsive behavior    Severe mixed bipolar I disorder with psychotic features ([facility] [address]    Suicidal ideations    Viral hepatitis C without hepatic coma    Aspiration pneumonia (HCC)    Hypokalemia    Cocaine use    Anemia    Overdose of trazodone     Past Medical History:   Diagnosis Date    Anxiety     Aspiration pneumonia ([facility] 3/19/2021    Borderline personality disorder ([facility]     Depression     Disease of blood and blood forming organ     Drug use     Hepatitis C     History of iron deficiency anemia     Liver disease      Recent Labs     03/17/21  0548   INR 1.0     Recent Labs     03/17/21  0548 03/18/21  0412 03/19/21  0731   HGB 14.4 11.7* 11.0*   HCT 43.7 35.9* 33.4*    169 See Reflexed IPF Result       Per the [facility] stress ulcer prophylaxis criteria, the following has been discontinued per P&T Guidelines:    Famotidine                                                                          Stress ulcer prophylaxis criteria:   Any one of the following major risk factors:   Mechanical ventilation ? 48 hours  Coagulopathy (platelets <52,318 mm3, INR >1.5, or aPTT >2 times control, not on anticoagulation)  History of gastrointestinal (GI) ulcerations or GI bleed within past year   Traumatic brain or spinal cord injury   Tiffany Coma Scale (GCS) ? 10 or inability to obey simple commands  Burn injuries affecting >35% body surface area    At least two of the following minor risk factors:   Length of ICU stay ?7 days  Occult GI bleeding (lasting 6 days or longer)  Sepsis/septic shock (vasopressor support and/or positive microbiologic cultures/suspected infection)  High dose corticosteroid use (>250 mg/day hydrocortisone or equivalent)  Hepatic failure [total bilirubin level >5 mg/dL, AST or ALT >150 U/L (3× ULN)] or partial hepatectomy  Acute renal failure   Transplantation perioperatively in the ICU  Multiple trauma; trauma sustained to more than one body region (injury severity score >15)    If the prescriber doesn't feel this discontinuation is appropriate, re-order the medication and place \"SUP appropriate per prescriber\" in comments of the order. If you would like further assistance or have questions, please contact inpatient pharmacy.      Thank you,  Eric Diaz, PharmD, HUNTER, BCPS 3/19/2021 2:11 PM

## 2021-03-19 NOTE — PROGRESS NOTES
Physical Therapy    Facility/Department: [facility] 4B STEPDOWN  Initial Assessment    NAME: Javier Cash  : 1990  MRN: [id number]    Date of Service: 3/19/2021    Discharge Recommendations: Further therapy recommended at discharge. PT Equipment Recommendations  Equipment Needed: Yes  Mobility Devices: Crystal Comfrey: Rolling    Assessment   Body structures, Functions, Activity limitations: Decreased functional mobility ; Decreased endurance;Decreased ADL status; Decreased balance;Decreased strength;Decreased high-level IADLs; Increased pain  Assessment: Pt ambulates 20+15 ft with RW and CGA, requires one sitting rest break during ambulation d/t decreased endurance. Pt requires 24 hr support currently d/t decreased balance and endurance, and high fall risk. pt would benefit from continued therapy to promote endurance, balance, and strengthening. Prognosis: Good  Decision Making: Medium Complexity  PT Education: Goals;PT Role;Plan of Care  Barriers to Learning: none  REQUIRES PT FOLLOW UP: Yes  Activity Tolerance  Activity Tolerance: Patient limited by endurance; Patient limited by fatigue       Patient Diagnosis(es): The encounter diagnosis was Overdose of trazodone. has a past medical history of Anxiety, Aspiration pneumonia ([address] Borderline personality disorder ([facility] Depression, Disease of blood and blood forming organ, Drug use, Hepatitis C, History of iron deficiency anemia, and Liver disease. has a past surgical history that includes Appendectomy and Appendectomy (Right).     Restrictions  Restrictions/Precautions  Restrictions/Precautions: Up as Tolerated, Fall Risk  Required Braces or Orthoses?: No  Position Activity Restriction  Other position/activity restrictions: suicide precautions  Vision/Hearing  Vision: Impaired  Vision Exceptions: Wears glasses at all times  Hearing: Within functional limits     Subjective  General  Patient assessed for rehabilitation services?: Yes  Family / Caregiver Present: No  Follows Commands: Within Functional Limits  Subjective  Subjective: RN and pt agreeable to PT. Pt agreeable and pleasant. Pt supine in bed at start of eval  Pain Screening  Patient Currently in Pain: Yes  Pain Assessment  Pain Assessment: 0-10  Pain Level: 4  Pain Type: Acute pain  Pain Location: Chest  Pain Orientation: Mid  Pain Descriptors: Aching;Discomfort  Pain Frequency: Continuous  Pain Onset: On-going  Non-Pharmaceutical Pain Intervention(s): Repositioned; Ambulation/Increased Activity  Response to Pain Intervention: Patient Satisfied  Vital Signs  Patient Currently in Pain: Yes       Orientation  Orientation  Overall Orientation Status: Within Functional Limits  Social/Functional History  Social/Functional History  Lives With: Alone  Type of Home: Homeless  ADL Assistance: Independent  Homemaking Assistance: Independent  Homemaking Responsibilities: Yes  Meal Prep Responsibility: Primary  Laundry Responsibility: Primary  Cleaning Responsibility: Primary  Shopping Responsibility: Primary  Ambulation Assistance: Independent  Transfer Assistance: Independent  Active : No  Mode of Transportation: Walk  Leisure & Hobbies: Music  Additional Comments: Pt reports typically having to negotiate full flight of stairs  Cognition   Cognition  Overall Cognitive Status: WFL    Objective          Joint Mobility  Spine: WFL  ROM RLE: WFL  ROM LLE: WFL  ROM RUE: WFL  ROM LUE: WFL  Strength RLE  Strength RLE: Exception  R Hip Flexion: 4-/5  R Knee Flexion: 4/5  R Knee Extension: 4-/5  R Ankle Dorsiflexion: 4/5  R Ankle Plantar flexion: 4/5  Strength LLE  Strength LLE: Exception  L Hip Flexion: 4-/5  L Knee Flexion: 4/5  L Knee Extension: 4-/5  L Ankle Dorsiflexion: 4/5  L Ankle Plantar Flexion: 4/5  Strength RUE  Strength RUE: WFL  Strength LUE  Strength LUE: WFL  Tone RLE  RLE Tone: Normotonic  Tone LLE  LLE Tone: Normotonic  Motor Control  Gross Motor?: WFL  Sensation  Overall Sensation Status: WFL  Bed mobility  Supine to Sit: Stand by assistance  Sit to Supine: Stand by assistance  Scooting: Stand by assistance  Comment: HOB elevated  Transfers  Sit to Stand: Contact guard assistance  Stand to sit: Contact guard assistance  Comment: RW for UE support  Ambulation  Ambulation?: Yes  More Ambulation?: No  Ambulation 1  Surface: level tile  Device: Rolling Walker  Assistance: Contact guard assistance  Gait Deviations: Slow Conchis;Decreased step length;Decreased step height  Distance: 20+15 ft  Comments: Notable unsteadiness, no significant episodes of LOB. Pt requires one sitting rest break d/t decreased endurance  Stairs/Curb  Stairs?: No     Balance  Posture: Good  Sitting - Static: Good  Sitting - Dynamic: Good  Standing - Static: Fair  Standing - Dynamic: Fair;-  Comments: Assessed with RW        Plan   Plan  Times per week: 5x  Current Treatment Recommendations: Strengthening, Neuromuscular Re-education, IADL Training, Home Exercise Program, Safety Education & Training, Balance Training, Endurance Training, Patient/Caregiver Education & Training, Functional Mobility Training, Equipment Evaluation, Education, & procurement, Transfer Training, Gait Training, ADL/Self-care Training, Stair training  Safety Devices  Type of devices:  All fall risk precautions in place, Call light within reach, Gait belt, Left in bed, Bed alarm in place             AM-PAC Score  AM-PAC Inpatient Mobility Raw Score : 18 (03/19/21 1127)  AM-PAC Inpatient T-Scale Score : 43.63 (03/19/21 1127)  Mobility Inpatient CMS 0-100% Score: 46.58 (03/19/21 1127)  Mobility Inpatient CMS G-Code Modifier : CK (03/19/21 1127)          Goals  Short term goals  Time Frame for Short term goals: 14 visits  Short term goal 1: Complete transfers independently  Short term goal 2: Complete 300ft of gait independently  Short term goal 3: Complete full flight of stairs independently  Short term goal 4: Participate in 30 minutes of therapy to promote endurance Therapy Time   Individual Concurrent Group Co-treatment   Time In 1005         Time Out 1027         Minutes 22         Timed Code Treatment Minutes: 8 Minutes       Baylee Salter   Evaluation/treatment performed by Student PT under the supervision of co-signing PT who agrees with all evaluation/treatment and documentation.

## 2021-03-19 NOTE — DISCHARGE INSTR - COC
Continuity of Care Form    Patient Name: Gabriela Cristina   :  1990  MRN:  [id number]    [facility] date:  3/17/2021  Discharge date:  ***    Code Status Order: Full Code   Advance Directives:   Advance Care Flowsheet Documentation       Date/Time Healthcare Directive Type of Healthcare Directive Copy in [address] Agent's Name Healthcare Agent's Phone Number    [phone]  Unknown, patient unable to respond due to medical condition -- -- -- -- --            Admitting Physician:  Abhilash Alegria MD  PCP: Tere Grullon MD    Discharging Nurse: Raymundo Valentin[address] Unit/Room#: 2888/6742-93  Discharging Unit Phone Number: [phone]    Emergency Contact:   Extended Emergency Contact Information  Primary Emergency Contact: ildaalka  Home Phone: [phone]  Relation: Other    Past Surgical History:  Past Surgical History:   Procedure Laterality Date    APPENDECTOMY      APPENDECTOMY Right        Immunization History:   Immunization History   Administered Date(s) Administered    Tdap (Boostrix, Adacel) 2018       Active Problems:  Patient Active Problem List   Diagnosis Code    Admitted to substance misuse detoxification center Z78.9    Attention-deficit hyperactivity disorder F90.9    Borderline personality disorder in adult [facility]) F60.3    Heroin abuse ([facility] [address] F11.10    Use of nicotine containing substance in combustion-free vaporization device Z72.0    Elevated LFTs R79.89    Respiratory decompensation J98.8    Bipolar 1 disorder ([facility] [address] F31.9    Depression F32.9    GAMALIEL (generalized anxiety disorder) F41.1    Hepatitis C antibody positive in blood R76.8    Migraine G43.909    Nicotine use disorder F17.200    Non-compliance Z91.19    Obsessive-compulsive behavior R46.81    Severe mixed bipolar I disorder with psychotic features ([facility] [address] F31.64    Suicidal ideations R45.851    Viral hepatitis C without hepatic coma B19.20    Aspiration pneumonia ([facility] J69.0 and Occupational Therapy  Weight Bearing Status/Restrictions: No weight bearing restirctions  Other Medical Equipment (for information only, NOT a DME order): Other Treatments: ***    Patient's personal belongings (please select all that are sent with patient):  None    RN SIGNATURE:  Electronically signed by Kelechi Peña RN on 3/19/21 at [address] PM EDT    CASE MANAGEMENT/SOCIAL WORK SECTION    Inpatient Status Date: 3/17/2021    Readmission Risk Assessment Score:  Readmission Risk              Risk of Unplanned Readmission:        25           Discharging to Facility/ Agency   Name: [facility]  Address:  Phone:  Fax:    Dialysis Facility (if applicable)   Name:  Address:  Dialysis Schedule:  Phone:  Fax:    / signature: Electronically signed by Gabby Colmenares RN on 3/19/21 at 4:52 PM EDT    PHYSICIAN SECTION    Prognosis: Fair    Condition at Discharge: Stable    Rehab Potential (if transferring to Rehab): Fair    Recommended Labs or Other Treatments After Discharge: Follow up with Psychiatry for medication adjustment. Continue Zyvox for aspiration pneumonia with respiratory cultures growing Staph aureus. Physician Certification: I certify the above information and transfer of Javier Cash  is necessary for the continuing treatment of the diagnosis listed and that she requires Institutional care.     Update Admission H&P: No change in H&P    PHYSICIAN SIGNATURE:  Electronically signed by Luis Mccarty MD on 3/19/21 at 5:13 PM EDT

## 2021-03-19 NOTE — PLAN OF CARE
Problem: OXYGENATION/RESPIRATORY FUNCTION  Goal: Patient will maintain patent airway  Outcome: Met This Shift     Problem: OXYGENATION/RESPIRATORY FUNCTION  Goal: Patient will achieve/maintain normal respiratory rate/effort  Description: Respiratory rate and effort will be within normal limits for the patient  Outcome: Met This Shift     Problem: SKIN INTEGRITY  Goal: Skin integrity is maintained or improved  3/19/2021 1717 by Thelma Cleveland RN  Outcome: Met This Shift     Problem: Suicide risk  Goal: Provide patient with safe environment  Description: Provide patient with safe environment  Outcome: Met This Shift     Problem: Skin Integrity - Impaired:  Goal: Absence of new skin breakdown  Description: Absence of new skin breakdown  3/19/2021 1717 by Thelma Cleveland RN  Outcome: Met This Shift     Problem: Falls - Risk of:  Goal: Will remain free from falls  Description: Will remain free from falls  3/19/2021 1717 by Thelma Cleveland RN  Outcome: Met This Shift     Problem: Falls - Risk of:  Goal: Absence of physical injury  Description: Absence of physical injury  Outcome: Met This Shift     Problem: Pain:  Goal: Pain level will decrease  Description: Pain level will decrease  Outcome: Met This Shift

## 2021-03-20 LAB
EKG ATRIAL RATE: 77 BPM
EKG P AXIS: 48 DEGREES
EKG P-R INTERVAL: 160 MS
EKG Q-T INTERVAL: 400 MS
EKG QRS DURATION: 96 MS
EKG QTC CALCULATION (BAZETT): 452 MS
EKG R AXIS: 50 DEGREES
EKG T AXIS: 24 DEGREES
EKG VENTRICULAR RATE: 77 BPM

## 2021-03-20 PROCEDURE — 1240000000 HC EMOTIONAL WELLNESS R&B

## 2021-03-20 PROCEDURE — 6370000000 HC RX 637 (ALT 250 FOR IP): Performed by: PHYSICIAN ASSISTANT

## 2021-03-20 PROCEDURE — 6370000000 HC RX 637 (ALT 250 FOR IP): Performed by: INTERNAL MEDICINE

## 2021-03-20 PROCEDURE — 6370000000 HC RX 637 (ALT 250 FOR IP): Performed by: PSYCHIATRY & NEUROLOGY

## 2021-03-20 PROCEDURE — APPSS30 APP SPLIT SHARED TIME 16-30 MINUTES: Performed by: PHYSICIAN ASSISTANT

## 2021-03-20 PROCEDURE — 99222 1ST HOSP IP/OBS MODERATE 55: CPT | Performed by: INTERNAL MEDICINE

## 2021-03-20 PROCEDURE — 93010 ELECTROCARDIOGRAM REPORT: CPT | Performed by: INTERNAL MEDICINE

## 2021-03-20 PROCEDURE — 99223 1ST HOSP IP/OBS HIGH 75: CPT | Performed by: PSYCHIATRY & NEUROLOGY

## 2021-03-20 RX ORDER — SERTRALINE HYDROCHLORIDE 25 MG/1
25 TABLET, FILM COATED ORAL DAILY
Status: DISCONTINUED | OUTPATIENT
Start: 2021-03-20 | End: 2021-03-21

## 2021-03-20 RX ADMIN — AMOXICILLIN AND CLAVULANATE POTASSIUM 1 TABLET: 875; 125 TABLET, FILM COATED ORAL at 08:27

## 2021-03-20 RX ADMIN — AMOXICILLIN AND CLAVULANATE POTASSIUM 1 TABLET: 875; 125 TABLET, FILM COATED ORAL at 22:17

## 2021-03-20 RX ADMIN — SERTRALINE HYDROCHLORIDE 25 MG: 25 TABLET ORAL at 15:08

## 2021-03-20 ASSESSMENT — SLEEP AND FATIGUE QUESTIONNAIRES
SLEEP PATTERN: UTA
RESTFUL SLEEP: NO
DO YOU USE A SLEEP AID: YES
DIFFICULTY STAYING ASLEEP: YES
AVERAGE NUMBER OF SLEEP HOURS: 3

## 2021-03-20 ASSESSMENT — LIFESTYLE VARIABLES: HISTORY_ALCOHOL_USE: NO

## 2021-03-20 NOTE — BH NOTE
`Behavioral Health [facility]  Admission Note     Admission Type:   Admission Type:  Involuntary(Signed in on admission)    Reason for admission:  Reason for Admission: Overdose on Trazodone    PATIENT STRENGTHS:  Strengths: Communication, Social Skills    Patient Strengths and Limitations:  Limitations: Hopeless about future, Difficulty problem solving/relies on others to help solve problems, Inappropriate/potentially harmful leisure interests, Tendency to isolate self    Addictive Behavior:   Addictive Behavior  In the past 3 months, have you felt or has someone told you that you have a problem with:  : None  Do you have a history of Chemical Use?: No  Do you have a history of Alcohol Use?: No  Do you have a history of Street Drug Abuse?: Yes(marijuana)  Histroy of Prescripton Drug Abuse?: No    Medical Problems:   Past Medical History:   Diagnosis Date    Anxiety     Aspiration pneumonia ([facility] [address] 3/19/2021    Borderline personality disorder ([facility]     Depression     Disease of blood and blood forming organ     Drug use     Hepatitis C     History of iron deficiency anemia     Liver disease        Status EXAM:  Status and Exam  Normal: No  Facial Expression: Avoids Gaze, Flat, Worried  Affect: Blunt  Level of Consciousness: Alert  Mood:Normal: No  Mood: Depressed, Empty  Motor Activity:Normal: No  Motor Activity: Decreased  Interview Behavior: Evasive  Preception: Lovell to Person, Sadie Sorrow to Time, [place] to Place, [place]ell to Situation  Attention:Normal: No  Attention: Distractible  Thought Processes: Circumstantial  Thought Content:Normal: No  Thought Content: Preoccupations  Hallucinations: None  Delusions: No  Memory:Normal: No  Memory: Poor Recent  Insight and Judgment: No  Insight and Judgment: Poor Judgment, Poor Insight  Present Suicidal Ideation: No  Present Homicidal Ideation: No    Tobacco Screening:  Practical Counseling, on admission, aggie X, if applicable and completed (first 3 are required if patient doesn't refuse):            ( )  Recognizing danger situations (included triggers and roadblocks)                    ( )  Coping skills (new ways to manage stress, exercise, relaxation techniques, changing routine, distraction)                                                           ( )  Basic information about quitting (benefits of quitting, techniques in how to quit, available resources  ( ) Referral for counseling faxed to [facility]                                           (x ) Patient refused counseling  ( ) Patient has not smoked in the last 30 days    Metabolic Screening:    No results found for: LABA1C    No results found for: CHOL  No results found for: TRIG  No results found for: HDL  No components found for: LDLCAL  No results found for: LABVLDL      Body mass index is 30.9 kg/m². BP Readings from Last 2 Encounters:   03/19/21 122/73   03/19/21 113/78           Pt admitted with followings belongings:  Dentures: None  Vision - Corrective Lenses: None  Hearing Aid: None  Jewelry: Earrings(1 earing)  Body Piercings Removed: No  Clothing: Footwear, Pants, Shirt, Socks  Were All Patient Medications Collected?: No  Other Valuables: Cell phone, Cued, Other (Comment)(vape pen, 2 pill bottles)     Valuables sent home with N/A. Valuables placed in safe in security envelope, number:  [id number]. Patient's home medications were not collected. Patient oriented to surroundings and program expectations and copy of patient rights given. Received admission packet:  Yes. Consents reviewed, signed in. Patient verbalize understanding:  Yes. Patient education on precautions: Yes    Patient was transferred from [facility]. Patient reported that she attempted suicide by an overdose on trazodone. Patient reports depression and anxiety related to life stressors, patient refuses to go into more detail at this time. Patient reports not taking any medications at home or having a pharmacy.  Patient was cooperative with the admission process and signed in upon admission. Patient was wanded for safety, nourishment provided.                   Constantino Romberg, RN

## 2021-03-20 NOTE — GROUP NOTE
Group Therapy Note    Date: 3/20/2021    Group Start Time: 1000  Group End Time: 2794  Group Topic: Psychoeducation    ISAURA BHI JER    DESTINY Alvarez LSW        Group Therapy Note    Attendees: 4/14         Patient was offered group therapy today but declined to participate despite encouragement. 1:1 was offered.     Signature:  DESTINY Alvarez LSW

## 2021-03-20 NOTE — BH NOTE
On call provider notified of patients respiratory culture performed at [facility]. New order received augmentin [id number] oral every 12 hours.

## 2021-03-20 NOTE — PROGRESS NOTES
Behavioral Services  Medicare Certification Upon Admission    I certify that this patient's inpatient psychiatric hospital admission is medically necessary for:    [x] (1) Treatment which could reasonably be expected to improve this patient's condition,       [x] (2) Or for diagnostic study;     AND     [x](2) The inpatient psychiatric services are provided while the individual is under the care of a physician and are included in the individualized plan of care.     Estimated length of stay/service 5-7 days    Plan for post-hospital care [facility]    Electronically signed by Carri Berger PA-C on 3/20/2021 at 9:08 AM

## 2021-03-20 NOTE — CONSULTS
[facility] Internal Medicine    CONSULTATION / HISTORY AND PHYSICAL EXAMINATION            Date:   3/20/2021  Patient name:  Eros Upton  Date of admission:  3/19/2021  7:42 PM  MRN:   [id number]  Account:  [de-identified]  YOB: 1990  PCP:    Fallon Garg MD  Room:   [address] [place] [address]  Code Status:    Full Code    Physician Requesting Consult: Nuria Betancur, *    Reason for Consult:  medical management    Chief Complaint:     No chief complaint on file. Recent trazodone overdose respiratory failure    History Obtained From:     Patient medical record nursing staff    History of Present Illness:     76-year-old young  lady who was admitted to [facility] critical care unit with a trazodone overdose and toxicity with acute hypoxic respiratory failure with pH of 7.2 patient was intubated ventilated conservative treatment  Patient improved and discharged to [facility] for the mental health care  Patient complained of some cough with some flecks of blood but no fever no chills no respiratory distress    Past Medical History:     Past Medical History:   Diagnosis Date    Anxiety     Aspiration pneumonia ([facility] 3/19/2021    Borderline personality disorder ([facility]     Depression     Disease of blood and blood forming organ     Drug use     Hepatitis C     History of iron deficiency anemia     Liver disease         Past Surgical History:     Past Surgical History:   Procedure Laterality Date    APPENDECTOMY      APPENDECTOMY Right         Medications Prior to Admission:     Prior to Admission medications    Medication Sig Start Date End Date Taking?  Authorizing Provider   linezolid (ZYVOX) 600 MG tablet Take 1 tablet by mouth 2 times daily for 14 days 3/19/21 4/2/21  Roberto Carlos Myers MD   cariprazine hcl (VRAYLAR) 1.5 MG capsule Take 1.5 mg by mouth daily    Historical Provider, MD   busPIRone (BUSPAR) 10 MG tablet Take 5 mg by mouth 2 times daily (1.626 m)   Wt 180 lb (81.6 kg)   SpO2 98%   BMI 30.90 kg/m²   Temp (24hrs), Av.2 °F (36.8 °C), Min:98 °F (36.7 °C), Max:98.3 °F (36.8 °C)    Recent Labs     21  0428   POCGLU 100     No intake or output data in the 24 hours ending 21 1627    General Appearance:  alert, well appearing, and in no acute distress  Mental status: oriented to person, place, and time with normal affect  Head:  normocephalic, atraumatic. Eye: no icterus, redness, pupils equal and reactive, extraocular eye movements intact, conjunctiva clear  Ear: normal external ear, no discharge, hearing intact  Nose:  no drainage noted  Mouth: mucous membranes moist  Neck: supple, no carotid bruits, thyroid not palpable  Lungs: Bilateral equal air entry, clear to ausculation, no wheezing, rales or rhonchi, normal effort  Cardiovascular: normal rate, regular rhythm, no murmur, gallop, rub. Abdomen: Soft, nontender, nondistended, normal bowel sounds, no hepatomegaly or splenomegaly  Neurologic: There are no new focal motor or sensory deficits, normal muscle tone and bulk, no abnormal sensation, normal speech, cranial nerves II through XII grossly intact  Skin: No gross lesions, rashes, bruising or bleeding on exposed skin area  Extremities:  peripheral pulses palpable, no pedal edema or calf pain with palpation      Investigations:      Laboratory Testing:  No results found for this or any previous visit (from the past 24 hour(s)).         Consultations:   IP CONSULT TO SOCIAL WORK  IP CONSULT TO INTERNAL MEDICINE  Assessment :      Primary Problem  Severe recurrent major depression without psychotic features [facility])    Active Hospital Problems    Diagnosis Date Noted    Severe recurrent major [facility] without psychotic features [facility] [F33.2]        Plan:     Reason trazodone overdose and toxicity respiratory failure was on ventilator with acute hypoxic respiratory failure  Recovered well  No fever clinically doing well  On oral antibiotic stop date 23rd March  We will sign off please call as needed      Vanessa Galicia MD  3/20/2021  4:27 PM    Copy sent to Dr. Rikki Robledo MD    Please note that this chart was generated using voice recognition Dragon dictation software. Although every effort was made to ensure the accuracy of this automated transcription, some errors in transcription may have occurred.

## 2021-03-20 NOTE — GROUP NOTE
Group Therapy Note    Date: 3/20/2021    Group Start Time: 1100  Group End Time: 7954  Group Topic: Recreational    [address] [facility], [facility]      Patient refused to attend Recreational Therapy Group at 1100 after encouragement from staff. 1:1 talk time offered.     Signature:  Talia Oviedo

## 2021-03-20 NOTE — BH NOTE
patient refused to attend wellness group at 1340 after encouragement from staff.   1:1 talk time provided as alternative to group session

## 2021-03-20 NOTE — PLAN OF CARE
[facility]  Initial Interdisciplinary Treatment Plan NO      Original treatment plan Date & Time: 3/20/2021 0901    Admission Type:  Admission Type:  Involuntary(Signed in on admission)    Reason for admission:   Reason for Admission: Overdose on Trazodone    Estimated Length of Stay:  5-7days  Estimated Discharge Date: to be determined by physician    PATIENT STRENGTHS:  Patient Strengths:Strengths: Communication, Social Skills  Patient Strengths and Limitations:Limitations: Hopeless about future, Difficulty problem solving/relies on others to help solve problems, Inappropriate/potentially harmful leisure interests, Tendency to isolate self  Addictive Behavior: Addictive Behavior  In the past 3 months, have you felt or has someone told you that you have a problem with:  : None  Do you have a history of Chemical Use?: No  Do you have a history of Alcohol Use?: No  Do you have a history of Street Drug Abuse?: Yes(marijuana)  Histroy of Prescripton Drug Abuse?: No  Medical Problems:  Past Medical History:   Diagnosis Date    Anxiety     [facility]    Borderline personality disorder ([facility]     Depression     Disease of blood and blood forming organ     Drug use     Hepatitis C     History of iron deficiency anemia     Liver disease      Status EXAM:Status and Exam  Normal: No  Facial Expression: Avoids Gaze, Flat, Worried  Affect: Blunt  Level of Consciousness: Alert  Mood:Normal: No  Mood: Depressed, Empty  Motor Activity:Normal: No  Motor Activity: Decreased  Interview Behavior: Evasive  Preception: Lawn to Person, Gearld Silvius to Time, Lawn to Place, Lawn to Situation  Attention:Normal: No  Attention: Distractible  Thought Processes: Circumstantial  Thought Content:Normal: No  Thought Content: Preoccupations  Hallucinations: None  Delusions: No  Memory:Normal: No  Memory: Poor Recent  Insight and Judgment: No  Insight and Judgment: Poor Judgment, Poor Insight  Present Suicidal Ideation: No  Present Homicidal Ideation: No    EDUCATION:   Learner Progress Toward Treatment Goals: reviewed group plans and strategies for care    Method:group therapy, medication compliance, individualized assessments and care planning    Outcome: needs reinforcement    PATIENT GOALS: to be discussed with patient within 72 hours    PLAN/TREATMENT RECOMMENDATIONS:     continue group therapy , medications compliance, goal setting, individualized assessments and care, continue to monitor pt on unit      SHORT-TERM GOALS:   Time frame for Short-Term Goals: 5-7 days    LONG-TERM GOALS:  Time frame for Long-Term Goals: 6 months  Members Present in Team Meeting: See Signature Sheet    Shantelle Rodriguez

## 2021-03-20 NOTE — H&P
Department of Psychiatry  Psychiatric Assessment   Reason for Admission to Psychiatric Unit:  Threat to self requiring 24 hour professional observation  Concerns about patient's safety in the community    CHIEF COMPLAINT:  Suicide attempt    HISTORY OF PRESENT ILLNESS:      Reyna Santo is a 31 y.o. female with a history of hepatitis C, liver disease, polysubstance use disorder, anxiety, BPD and  depression who was admitted directly from [address] [place] [address] following a suicide attempt by intentional overdose of Trazodone. Patient was seen and evaluated by psychiatric consult services on 3/19/2021. Per the consult service:    Patient refuses to talk about the events leading up to her overdose. She was cooperative with getting some elements of history but would often state that she either could not remember or did not want to talk about things. She did tell me that she had completed 3 weeks at [facility] recovery and then was transferred to a sober living house. She reported she did not like the sober living house and left and relapsed. She was frustrated with herself and states this was one of the reasons that she overdosed with an intention of ending her life. .  She does report suffering from both substance use as well as major depression throughout her whole life. She affirms depressed mood, anhedonia, decreased energy and concentration, poor appetite, suicidal ideations, poor sleep with depressive episodes and feels that she is in a depressive episode now. She denies any history of psychosis now or in the past.  She denies episodes of sanam. Denies trauma    Today:   When asked what led to her admission, Sony Ulloa states she overdosed on like 60 trazodone. She is unsure how long she had suicidal ideation prior to her attempt because she has BPD and constantly has suicidal ideation. She states she was diagnosed with BPD 12-13 years ago.  She does not want to talk about recent stressors that led to to her suicide attempt. She states she is always depressed. She is unsure when this most recent episode of depression started but endorses feeling down and sad for more days than not. She reports she was sleeping fine because she was on drugs. Her appetite has not been good. She endorses low energy and motivation throughout the day. Has poor attention and concentration. She has been feeling worthless, hopeless and helpless. She has a history of depression, anxiety and BPD. [facility] She has been off psychotropic medication for a few months. She reports Arpan Catalan worked well in the past for her suicidal ideation. She has had multiple past suicide attempts and inpatient psychiatric admissions. Toi Han reports she is feeling really tired today. She asks if she has pneumonia. Her respiratory culture done at [facility]. [facility] was positive for staph. There was concern for aspiration pneumonia and was put on Augmentin BID. She feels exhausted and was coughing up blood last night. Will consult internal medicine. Toi Han continues to feel depressed today. She denies current thoughts of harm to herself or others. Denies any hallucinations. Does not exhibit any symptoms of sanam or hypomania. No evidence of delusions or overt psychosis on examination. She is receptive to starting an antidepressant to help with her mood. She denies any opiate withdrawal symptoms today. PSYCHIATRIC HISTORY:      · Outpatient psychiatric provider:  Not currently. .. stopped because started using fentanyl again  · Suicide attempts: 3 prevous  · Inpatient psychiatric admissions: 6 times    Past psychiatric medications includes:   Wellbutrin, Buspar, Vraylar, Lamictal    Adverse reactions from psychotropic medications:    Denies      Past Medical History:        Diagnosis Date    Anxiety     Aspiration pneumonia ([facility] 3/19/2021    Borderline personality disorder ([facility]     Depression     Disease of blood and blood forming organ     Drug use     Hepatitis C     History of iron deficiency anemia     Liver disease        Past Surgical History:        Procedure Laterality Date    APPENDECTOMY      APPENDECTOMY Right        Medications Prior to Admission:   Medications Prior to Admission: linezolid (ZYVOX) 600 MG tablet, Take 1 tablet by mouth 2 times daily for 14 days  cariprazine hcl (VRAYLAR) 1.5 MG capsule, Take 1.5 mg by mouth daily  busPIRone (BUSPAR) 10 MG tablet, Take 5 mg by mouth 2 times daily  buPROPion (WELLBUTRIN XL) 300 MG extended release tablet, 1 tablet in the morning  guanFACINE (INTUNIV) 2 MG TB24 extended release tablet, Take 2 mg by mouth daily  lamoTRIgine (LAMICTAL) 100 MG tablet, Take 50 mg by mouth 2 times daily     Allergies:  Aspirin, Codeine, and Ibuprofen    Social History:   RESIDENCE: homeless   : denies                     CHILDREN: denies  OCCUPATION: unemployed   EDUCATION:  grad    DRUG USE HISTORY  Social History     Tobacco Use   Smoking Status Former Smoker    Packs/day: 1.00    Types: Cigarettes   Smokeless Tobacco Never Used   Tobacco Comment    0.5 ppd     Social History     Substance and Sexual Activity   Alcohol Use No     Social History     Substance and Sexual Activity   Drug Use Yes    Types: IV, Opiates , Marijuana    Comment: IV/ heroin/fentanyl- 0.5 g/ day/ 5 months/ last used 1-28-20 @ noon; smoke 1 gram of marjuana/day; sober for 1 year, relapsed 1/2019; started use 5 yrs ago and a became a problem then; no current legal issues   fentanyl, extensive, says yes to etoh, heroin, acid, LSD, PCP, mushrooms. UDS positive for cocaine and benzodiazepine on 3/17/2021.      LEGAL HISTORY:   HISTORY OF INCARCERATION: yes    Family Psychiatric and Medical History:   Denies any family psychiatric history  Denies suicides in family  Denies substance use in family        Problem Relation Age of Onset    No Known Problems Mother         no health problems    No Known Problems Father         no health problems          Psychiatric Review of Systems      ·    Obsessions and Compulsions: denies  ·    Rosalind or Hypomania: denies  ·    Hallucinations: denies  ·    Panic Attacks:  denies   ·    Delusions:  denies  ·    Phobias: denies       Medical Review of Systems:     Constitutional: Negative for appetite change, diaphoresis, fatigue and fever. HENT: Negative for congestion, sore throat and tinnitus. Eyes: Negative for visual disturbance. Respiratory: Negative for cough, shortness of breath and wheezing. Cardiovascular: Negative for chest pain and leg swelling. Gastrointestinal: Negative for nausea, vomiting, diarrhea. Negative for abdominal pain. Genitourinary: Negative for frequency. Musculoskeletal: Negative for arthralgias, myalgias and neck stiffness. Skin: Negative for puritis. Neurological: Negative for dizziness, weakness and headaches. All other systems reviewed and are negative. PHYSICAL EXAM:  Vitals:  /73   Pulse 75   Temp 98.3 °F (36.8 °C) (Oral)   Resp 15   Ht 5' 4\" (1.626 m)   Wt 180 lb (81.6 kg)   SpO2 98%   BMI 30.90 kg/m²       Physical Exam:    Constitutional: Well developed, well nourished, no acute distress  Eyes: Pupils round and reactive to light bilaterally  Neck:  Supple, no thyromegaly. Cardiovascular:  Normal rate and rhythm, normal S1 and S2. No murmur or gallop on auscultation. Radial pulses 2+ and brisk bilaterally  Lungs: Clear to auscultation bilaterally without wheezing or rales. Musculoskeletal:  Full range of motion in all four extremities. Neurologic:  Cranial nerves II through XII are grossly intact. Normal gait and station.        Mental Status Examination:    Level of consciousness:  Within normal limits  Appearance: hospital attire, in chair, fair grooming  Behavior/Motor:  no abnormalities noted  Attitude toward examiner:  cooperative, somewhat withdrawn  Speech: Spontaneous, normal rate and volume  Mood: Depressed  Affect: mood congruent  Thought processes:  Linear, goal directed, coherent  Thought content:Denies suicidal ideations              Denies homicidal ideations               Denies hallucinations              Denies delusions  Cognition:  Oriented to self, situation, location, date  Concentration fair  Memory is intact  Insight & Judgment: Poor        DSM-5 DIAGNOSIS:    Recurrent severe major depression without psychosis  GAMALIEL  Opiate use disorder  Polysubstance use disorder (alcohol, marijuana, mushrooms, ecstasy, LSD, acid, PCP, cocaine, crack)  Borderline personality disorder per history  ADHD per history    Patient Active Problem List   Diagnosis    Admitted to substance misuse detoxification center    Attention-deficit hyperactivity disorder    Borderline personality disorder in adult [facility])    Heroin abuse ([address]    Use of nicotine containing substance in combustion-free vaporization device    Elevated LFTs    Respiratory decompensation    Bipolar 1 disorder (HCC)    Depression    GAMALIEL (generalized anxiety disorder)    Hepatitis C antibody positive in blood    Migraine    Nicotine use disorder    Non-compliance    Obsessive-compulsive behavior    Severe mixed bipolar I disorder with psychotic features ([address]    Suicidal ideations    Viral hepatitis C without hepatic coma    Aspiration pneumonia (HCC)    Hypokalemia    Cocaine use    Anemia    Overdose of trazodone    Severe recurrent major depression without psychotic features ([address]    H/O major depression          Psychosocial and Contextual Factors:   Financial  Occupational  Substance use  Living situation      Past Medical History:   Diagnosis Date    Anxiety     Aspiration pneumonia ([address] 3/19/2021    Borderline personality disorder ([address]     Depression     Disease of blood and blood forming organ     Drug use     Hepatitis C     History of iron deficiency anemia     Liver disease           TREATMENT PLAN  Risk Management:  close watch per standard protocol  Psychotherapy:  participation in milieu and group and individual sessions with Attending Physician,  and Physician Assistant/CNP  Reason for Admission to Psychiatric Unit:  Threat to self  Estimated length of stay: It might take more than 2 midnights to stabilize patient's mood/thoughts and titrate medications to effect. GENERAL PATIENT/FAMILY EDUCATION  Patient will understand basic signs and symptoms, Patient will understand benefits/risks and potential side effects from proposed meds and Patient will understand their role in recovery. Family is  active in patient's care. Patient assets that may be helpful during treatment include: Intent to participate and engage in treatment, sufficient fund of knowledge and intellect to understand and utilize treatments. Risk level: High     Goals:    Reviewed labs  Reviewed EKG  Will obtain records and review them today. Medication adjustment: Add Zoloft 25mg daily  Consults: Internal medicine as patient is complaining of significant fatigue and hemoptysis  Encouraged patient to engage in groups, milieu, and individual therapies offered as part of programing. Behavioral Services  Medicare Certification Upon Admission    I certify that this patient's inpatient psychiatric hospital admission is medically necessary for:   X (1) Treatment which could reasonably be expected to improve this patient's condition,      X (2) Or for diagnostic study;     AND     X (2) The inpatient psychiatric services are provided while the individual is under the care of a physician and are included in the individualized plan of care. Estimated length of stay/service: Greater than two midnights will be required to reach therapeutic levels of medications and to stabilize mood    Plan for post-hospital care:  Follow up with outpatient psychiatric services    Electronically signed by Nathaniel Key PA-C on 3/20/2021 at 9:05 AM                                     Psychiatry Attending Attestation     I assessed this patient and reviewed the case and plan of care with Nathaniel Key PA-C. I have reviewed the above documentation and I agree with the findings and treatment plan with the following updates. Patient is a 76-year-old single  female with history of depression admitted following an intentional overdose on 6 mg of trazodone. Patient had to be intubated and was placed in ICU. Patient reports she has been feeling down sad and low for more days than not for last several weeks now. Endorses anhedonia. Identifies stressors as dealing with substance use issues. Reports constant feelings of helplessness and hopelessness for last several weeks. Reports poor energy and concentration. Reports having trouble falling asleep and staying asleep. Patient was very flat and withdrawn on approach. She did not want to talk about the stressors that she has been dealing with. Reports that she will discuss them when she is feeling a little better. Agree with rest of the assessment and plan as above. Electronically signed by Aleta Fink MD on 3/20/21 at 1:28 PM EDT    **This report has been created using voice recognition software. It may contain minor errors which are inherent in voice recognition technology. **

## 2021-03-20 NOTE — PLAN OF CARE
Problem: Suicide risk  Goal: Provide patient with safe environment  Description: Provide patient with safe environment  3/20/2021 1749 by Allie Alfaro  Outcome: Ongoing    Patient has been isolative to her room for long intervals. Flat affect, depressed mood. Tearful during one to one talk time, poor eye contact and relates minimally. Says she has a lot that she has been dealing with for a long time, but does not go into specifics. Says she has not  opened up to anyone. Took medications today. Takes a shower and is a little brighter on approach this evening. Controlled and cooperative. Positive for suicidal thoughts, states, \"I don't want to be here\", and when asked if she means in the hospital or alive, and she said, \"both\", does not have a plan at this time. Accepting of staff talk time and support. Says music helps her, and sits and listens in day room at times. Appetite is low, ate breakfast but not lunch or dinner.

## 2021-03-20 NOTE — PROGRESS NOTES
Patient given tobacco quitline number 52476854125 at this time, refusing to call at this time, states \" I just dont want to quit now\"- patient given information as to the dangers of long term tobacco use. Continue to reinforce the importance of tobacco cessation.

## 2021-03-20 NOTE — CARE COORDINATION
Yvonne Christie [facility] Biopsychosocial Assessment    Current Level of Psychosocial Functioning     Independent X  Dependent    Minimal Assist     Comments:    Psychosocial High Risk Factors (check all that apply)    Unable to obtain meds   Chronic illness/pain    Substance abuse  X  Lack of Family Support X  Financial stress   Isolation   Inadequate Community Resources  Suicide attempt(s) X  Not taking medications X  Victim of crime   Developmental Delay  Unable to manage personal needs    Age 71 or older   Homeless X  No transportation   Readmission within 30 days  Unemployment  Traumatic Event    Comments:   Psychiatric Advanced Directives: n/a    Family to Involve in Treatment: patient reports she has no support system    Sexual Orientation:   n/a    Patient Strengths:  [address] Medicaid, interested in AOD treatment    Patient Barriers: suicidal thoughts, overdose, non-adherence to [address] treatment/medications, substance use, no family/friends support      Opiate Education Provided:  Patient was given folder for treatment information and encouraged to communicate with SW her interests for treatment      CMHC/mental health history: Patient stated she had been to [place] for treatment for substance use as well as mental health but she cannot return. She has a history of depression, anxiety and personality disorder. Plan of Care   medication management, group/individual therapies, family meetings, psycho -education, treatment team meetings to assist with stabilization    Initial Discharge Plan:  Patient to pursue inpatient treatment options with the help of SW.      Clinical Summary:    Ana Paula Carvalho is a 32 y/o female who presented to the [facility] ED after an attempt to overdose on Trazodone. As a result of patient's overdose she was required to be admitted to ICU while ventilated at [facility] until medically cleared to be admitted to [place] [address] according to her chart.   She presents today for this assessment evasive and withdrawn but otherwise cooperative. Patient exhibited fair eye contact and was sitting up on her bed while talking with SW. She reports daily use of fentanyl including shortly before her admission to the ED but declined to talk about why she attempted the overdose. Antoinette Kaufman stated she has had two prior suicide attempts. Denies current suicidal ideations. She also denies audio/visual hallucinations. Patient has a history of mental health diagnoses of depression, anxiety and borderline personality disorder.   She has been in AOD treatment in the past at [facility] and is interested in pursuing treatment again but will have to consider other options because she is unable to return to [facility].  She has been given information and encouraged to coordinate with  for inpatient options at her request.

## 2021-03-20 NOTE — BH NOTE
Pt did not participate in open recreation group due to resting in room and choosing to not attend. 1:1 talk time offered and Pt refused.

## 2021-03-20 NOTE — GROUP NOTE
Group Therapy Note    Date: 3/20/2021    Group Start Time: 0900  Group End Time: 0915  Group Topic: Community Meeting    [address] [facility]    Melany Islas, [address]     Patient refused to attend Community Meeting Group at 0900 after encouragement from staff. 1:1 talk time offered.     Signature:  Yady Abdullahi

## 2021-03-21 LAB
CULTURE: ABNORMAL
CULTURE: ABNORMAL
DIRECT EXAM: ABNORMAL
Lab: ABNORMAL
SPECIMEN DESCRIPTION: ABNORMAL

## 2021-03-21 PROCEDURE — 6370000000 HC RX 637 (ALT 250 FOR IP): Performed by: INTERNAL MEDICINE

## 2021-03-21 PROCEDURE — 1240000000 HC EMOTIONAL WELLNESS R&B

## 2021-03-21 PROCEDURE — 6370000000 HC RX 637 (ALT 250 FOR IP): Performed by: PSYCHIATRY & NEUROLOGY

## 2021-03-21 PROCEDURE — 6370000000 HC RX 637 (ALT 250 FOR IP): Performed by: PHYSICIAN ASSISTANT

## 2021-03-21 PROCEDURE — 99232 SBSQ HOSP IP/OBS MODERATE 35: CPT | Performed by: PSYCHIATRY & NEUROLOGY

## 2021-03-21 RX ADMIN — SERTRALINE HYDROCHLORIDE 25 MG: 25 TABLET ORAL at 08:21

## 2021-03-21 RX ADMIN — AMOXICILLIN AND CLAVULANATE POTASSIUM 1 TABLET: 875; 125 TABLET, FILM COATED ORAL at 08:21

## 2021-03-21 RX ADMIN — AMOXICILLIN AND CLAVULANATE POTASSIUM 1 TABLET: 875; 125 TABLET, FILM COATED ORAL at 21:10

## 2021-03-21 RX ADMIN — IBUPROFEN 400 MG: 400 TABLET, FILM COATED ORAL at 08:21

## 2021-03-21 RX ADMIN — NICOTINE POLACRILEX 2 MG: 2 GUM, CHEWING BUCCAL at 21:10

## 2021-03-21 ASSESSMENT — PAIN DESCRIPTION - PAIN TYPE: TYPE: ACUTE PAIN

## 2021-03-21 ASSESSMENT — PAIN DESCRIPTION - LOCATION: LOCATION: HEAD

## 2021-03-21 NOTE — PLAN OF CARE
Problem: Suicide risk  Goal: Provide patient with safe environment  Description: Provide patient with safe environment  3/21/2021 1821 by Tina Trinidad  Outcome: Ongoing   Patient is isolative most of the afternoon, sat in dayroom in the morning but quiet, preoccupied, aloof and asocial. Attends one group with much encouragement. Takes medication. Poor appetite, ate only some of breakfast. On phone at times. Evasive, flat affect, depressed mood. Makes suicidal comments, that she doesn't want to live anymore, but she doesn't want to be in the hospital either. Hopeless and helpless. Accepting of staff support, but does not get into specific issues.

## 2021-03-21 NOTE — PLAN OF CARE
Judgment: Poor Judgment, Poor Insight  Present Suicidal Ideation: Yes(no plan)  Present Homicidal Ideation: No    Daily Assessment Last Entry:   Daily Sleep (WDL): Within Defined Limits         Patient Currently in Pain: Yes  Daily Nutrition (WDL): Within Defined Limits  Appetite Change: Decreased  Barriers to Nutrition: Other (Comment)(Increased depression)  Level of Assistance: Independent/Self    Patient Monitoring:  Frequency of Checks: 4 times per hour, close    Psychiatric Symptoms:   Depression Symptoms  Depression Symptoms: Isolative, Loss of interest, Feelings of helplessness, Feelings of hopelessess, Feelings of worthlessness  Anxiety Symptoms  Anxiety Symptoms: Generalized  Rosalind Symptoms  Rosalind Symptoms: No problems reported or observed. Psychosis Symptoms  Delusion Type: No problems reported or observed. Suicide Risk CSSR-S:  1) Within the past month, have you wished you were dead or wished you could go to sleep and not wake up? : Yes  2) Have you actually had any thoughts of killing yourself? : Yes  3) Have you been thinking about how you might kill yourself? : Yes  5) Have you started to work out or worked out the details of how to kill yourself? Do you intend to carry out this plan? : Yes  6) Have you ever done anything, started to do anything, or prepared to do anything to end your life?: Yes  Change in Result: No Change in Plan of care: No      EDUCATION:   EDUCATION:   Learner Progress Toward Treatment Goals: Reviewed results and recommendations of this team, Reviewed group plan and strategies, Reviewed signs, symptoms and risk of self harm and violent behavior, Reviewed goals and plan of care    Method:small group, individual verbal education    Outcome:verbalized by patient, but needs reinforcement to obtain goals    PATIENT GOALS:  Short term: Get rid of suicidal thoughts. Decrease depression. Adjust to new medications.    Long term: Continue to see counselor and psychiatrist. Get linked with sober living / AOD treatment.      PLAN/TREATMENT RECOMMENDATIONS UPDATE: continue with group therapies, increased socialization, continue planning for after discharge goals, continue with medication compliance    SHORT-TERM GOALS UPDATE:   Time frame for Short-Term Goals: 5-7 days    LONG-TERM GOALS UPDATE:   Time frame for Long-Term Goals: 6 months  Members Present in Team Meeting: See Signature Sheet    Brice Solis, [address]

## 2021-03-21 NOTE — PROGRESS NOTES
Daily Progress Note  Rhett Whitmore MD  3/21/2021  CHIEF COMPLAINT: Suicide attempt    Reviewed patient's current plan of care and vital signs with nursing staff. Vitals:    03/21/21 0758   BP: 111/68   Pulse: 98   Resp: 14   Temp: 97.9 °F (36.6 °C)   SpO2:        Sleep: Patient reports she slept about 3 to 4 hours. She reports she slept a lot during the day yesterday and woke up at 3 AM. She reports she \"fell asleep fine\". Attending groups: No    SUBJECTIVE:    Ernest Litten was seen on the unit today in the day area. Patient has been medication compliant and behaviorally in control. Patient has received as needed Ibuprofen on March 21 at [address] AM. When writer approached patient, patient was sitting at a table in the day area by herself. Patient reports her mood is \"bad\". Patient presents with depressed mood and congruent affect. Patient endorses fleeting suicidal ideations every \"couple hours\". Patient denies suicidal plan. Patient contracts for safety on the unit. Patient denies homicidal ideation or homicidal plan. Patient denies auditory hallucinations or visual hallucinations. Patient denies medication side effects. Patient reports she has not been attending groups and reports \"I do not want to\". Patient reports she slept about 3 to 4 hours. She reports she slept a lot during the day yesterday and woke up at 3 AM. She reports she \"fell asleep fine\". Patient reports having decreased appetite and reports eating \"2-3 bites\". Patient reports a medical concern of feeling tired and coughing. Internal medicine consult was completed. Patient continues to be monitored in the inpatient psychiatric facility at [facility] for safety and stabilization. Mental Status Exam  Level of consciousness: Alert and oriented  Appearance: Hospital attire, seated in chair, with fair grooming and hygiene   Behavior/Motor:  Withdrawn, no psychomotor abnormalities noted  Attitude toward examiner:  Cooperative, withdrawn  Speech: Spontaneous, normal rate, normal volume and well articulated  Mood: Patient reports her mood as \"bad\". Patient presents with depressed mood. Affect: Congruent to affect. Thought processes:  Linear, goal directed and coherent  Thought content:  Denies homicidal ideation  Suicidal Ideation: Denies suicidal ideation  Delusions:  No evidence of delusions  Perceptual Disturbance:  Denies any perceptual disturbance  Cognition:  Oriented to self, location, time, and situation  Memory: Intact  Insight & Judgement: Poor  Medication side effects:  Denies       Data   height is 5' 4\" (1.626 m) and weight is 180 lb (81.6 kg). Her oral temperature is 97.9 °F (36.6 °C). Her blood pressure is 111/68 and her pulse is 98. Her respiration is 14 and oxygen saturation is 98%. Labs:   No visits with results within 2 Day(s) from this visit. Latest known visit with results is:   No results displayed because visit has over 200 results. Medications  Current Facility-Administered Medications: sertraline (ZOLOFT) tablet 25 mg, 25 mg, Oral, Daily  aluminum & magnesium hydroxide-simethicone (MAALOX) 200-200-20 MG/5ML suspension 30 mL, 30 mL, Oral, Q6H PRN  polyethylene glycol (GLYCOLAX) packet 17 g, 17 g, Oral, Daily PRN  traZODone (DESYREL) tablet 50 mg, 50 mg, Oral, Nightly PRN  ibuprofen (ADVIL;MOTRIN) tablet 400 mg, 400 mg, Oral, Q6H PRN  acetaminophen (TYLENOL) tablet 650 mg, 650 mg, Oral, Q4H PRN  hydrOXYzine (ATARAX) tablet 50 mg, 50 mg, Oral, TID PRN  amoxicillin-clavulanate (AUGMENTIN) 875-125 MG per tablet 1 tablet, 1 tablet, Oral, 2 times per day    ASSESSMENT  Severe recurrent major depression without psychotic features ([facility] [address]     PLAN  Patient's symptoms unchanged. Zoloft dose increased to 50 mg daily. Attempt to develop insight. Psycho-education conducted. Supportive Therapy conducted. Probable discharge is to be determined. Follow-up daily while inpatient.     Electronically signed by RENARD Garcia CNP on 3/21/21 at 4:52 PM EDT    **This report has been created using voice recognition software. It may contain minor errors which are inherent in voice recognition technology. **

## 2021-03-21 NOTE — GROUP NOTE
Group Therapy Note    Date: 3/21/2021    Group Start Time: 0900  Group End Time: 0930  Group Topic: Community Meeting    [address] [facility], [facility]     Patient refused to attend [employer] at 0900 after encouragement from staff. 1:1 talk time offered.     Signature:  Dillon Higgins

## 2021-03-21 NOTE — GROUP NOTE
Group Therapy Note    Date: 3/21/2021    Group Start Time: 1330  Group End Time: 3951  Group Topic: Recreational    [address] [facility], [facility]    Patient refused to attend Recreational Therapy Group at 1330 after encouragement from staff. 1:1 talk time offered.     Signature:  Audrey Justice

## 2021-03-21 NOTE — PLAN OF CARE
Problem: Suicide risk  Goal: Provide patient with safe environment  Description: Provide patient with safe environment  3/21/2021 0054 by Corazon Giraldo RN  Outcome: Ongoing   Patient endorses suicidal ideations. Patient denies having a plan. Patient agrees to notify staff if she can no longer maintain safety. Patient reports depression. Patient is flat and isolative to room throughout shift. Patient is compliant with her medications. Patient remains safe on unit. Patient safety maintained q15 minute checks.

## 2021-03-21 NOTE — GROUP NOTE
Group Therapy Note    Date: 3/21/2021    Group Start Time: 1100  Group End Time: 1200  Group Topic: Group Therapy    [facility]    [place]        Group Therapy Note    Attendees: 11/17         Patient's Goal:  Attend and participate in wellness group  Notes: Recovery questions    Status After Intervention:  Improved    Participation Level:  Active Listener    Participation Quality: Attentive      Speech:  normal      Thought Process/Content: Logical      Affective Functioning: Congruent      Mood: anxious      Level of consciousness:  Alert      Response to Learning: Able to verbalize current knowledge/experience      Endings: None Reported    Modes of Intervention: Education, Support, Socialization, Exploration and Problem-solving      Discipline Responsible: [address], [employer]      Signature:  Monique Garcia

## 2021-03-22 PROCEDURE — 99232 SBSQ HOSP IP/OBS MODERATE 35: CPT | Performed by: PSYCHIATRY & NEUROLOGY

## 2021-03-22 PROCEDURE — 6370000000 HC RX 637 (ALT 250 FOR IP): Performed by: PSYCHIATRY & NEUROLOGY

## 2021-03-22 PROCEDURE — 6370000000 HC RX 637 (ALT 250 FOR IP): Performed by: INTERNAL MEDICINE

## 2021-03-22 PROCEDURE — 1240000000 HC EMOTIONAL WELLNESS R&B

## 2021-03-22 RX ADMIN — SERTRALINE 50 MG: 50 TABLET, FILM COATED ORAL at 09:42

## 2021-03-22 RX ADMIN — AMOXICILLIN AND CLAVULANATE POTASSIUM 1 TABLET: 875; 125 TABLET, FILM COATED ORAL at 09:42

## 2021-03-22 RX ADMIN — AMOXICILLIN AND CLAVULANATE POTASSIUM 1 TABLET: 875; 125 TABLET, FILM COATED ORAL at 21:43

## 2021-03-22 RX ADMIN — NICOTINE POLACRILEX 2 MG: 2 GUM, CHEWING BUCCAL at 17:54

## 2021-03-22 NOTE — PLAN OF CARE
Problem: Suicide risk  Goal: Provide patient with safe environment  Description: Provide patient with safe environment  Outcome: Ongoing   Patient endorses suicidal ideations. Patient denies having a plan. Patient agrees to notify staff if she can no longer maintain safety. Patient reports depression. Patient is flat and isolative to room throughout shift. Patient is compliant with her medications. Patient remains safe on unit. Patient safety maintained q15 minute checks.

## 2021-03-22 NOTE — GROUP NOTE
Group Therapy Note    Date: 3/22/2021    Group Start Time: 1100  Group End Time: 2416  Group Topic: Psychoeducation    [facility]    [place], [place]        Group Therapy Note    Attendees: 7/11         Pt did not attend RT skills group d/t resting in room despite staff invitation to attend. 1:1 talk time offered as alternative to group session, pt declined.

## 2021-03-22 NOTE — PLAN OF CARE
Problem: Suicide risk  Goal: Provide patient with safe environment  Description: Provide patient with safe environment  Outcome: Ongoing   Pt isolative to self. Pt reports depression and suicidal thoughts at this time. Pt is isolative to room out for needs only. Pt does  not attend attend any groups on unit. Pt is pleasant with staff and others upon approach.

## 2021-03-22 NOTE — PROGRESS NOTES
Daily Progress Note  3/22/2021  CHIEF COMPLAINT: Suicide attempt    Reviewed patient's current plan of care and vital signs with nursing staff. Vitals:    03/22/21 0800   BP: 130/62   Pulse: 68   Resp: 14   Temp: 98 °F (36.7 °C)   SpO2:        Sleep: Patient reports restorative sleep for about 6 hours last night. Attending groups: No    SUBJECTIVE:    Tanya Elizabeth was seen in her room today. Patient has been medication compliant and behaviorally in control. Patient has been administered the following as needed medications:     Ibuprofen tablet administered on March 21 at 8:21 AM  Nicorette gum administered on March 21 at 9:10 PM     When writer approached patient, patient was resting in bed. She reported her mood as \"better\". Patient reports restorative sleep for about 6 hours last night and adequate appetite. Patient reports improvement in suicidal ideation and contracts for safety on the unit. Patient reports her depression as a 7 out of 10 today (010 scale with 0 being no depression and 10 being the worst). Patient rates her anxiety as a 7 out of 10 (010 scale with 0 being no anxiety and 10 being the worst). Patient denies suicidal plan, homicidal ideation, homicidal plan, auditory hallucinations, or visual hallucinations. Patient denies any side effects to her medication regimen. Patient reports she had a headache yesterday but denies having headache today. Patient denies any medical concerns at this time. Patient reports she does not wish to attend group and would like to rest. Patient continues to be monitored in the inpatient psychiatric facility at [facility] for safety and stabilization. Patient verbalizes she would like to claim for unemployment with .     Mental Status Exam  Level of consciousness: Alert and oriented  Appearance: Hospital attire, resting in bed, with fair grooming and hygiene   Behavior/Motor: Pleasant upon approach, no psychomotor abnormalities noted  Attitude toward examiner:  Cooperative, appropriate eye contact  Speech: Spontaneous, normal rate, normal volume and well articulated  Mood: Patient reports her mood as \"better\". Patient presents with depressed mood. Affect: Congruent to affect. Thought processes:  Linear, goal directed and coherent  Thought content:  Denies homicidal ideation  Suicidal Ideation: Reports improved suicidal ideation  Delusions:  No evidence of delusions  Perceptual Disturbance:  Denies any perceptual disturbance  Cognition:  Oriented to self, location, time, and situation  Memory: Intact  Insight & Judgement: Poor  Medication side effects:  Denies       Data   height is 5' 4\" (1.626 m) and weight is 180 lb (81.6 kg). Her oral temperature is 98 °F (36.7 °C). Her blood pressure is 130/62 and her pulse is 68. Her respiration is 14 and oxygen saturation is 98%. Labs:   No visits with results within 2 Day(s) from this visit. Latest known visit with results is:   No results displayed because visit has over 200 results. Medications  Current Facility-Administered Medications: sertraline (ZOLOFT) tablet 50 mg, 50 mg, Oral, Daily  nicotine polacrilex (NICORETTE) gum 2 mg, 2 mg, Oral, PRN  aluminum & magnesium hydroxide-simethicone (MAALOX) 200-200-20 MG/5ML suspension 30 mL, 30 mL, Oral, Q6H PRN  polyethylene glycol (GLYCOLAX) packet 17 g, 17 g, Oral, Daily PRN  traZODone (DESYREL) tablet 50 mg, 50 mg, Oral, Nightly PRN  ibuprofen (ADVIL;MOTRIN) tablet 400 mg, 400 mg, Oral, Q6H PRN  acetaminophen (TYLENOL) tablet 650 mg, 650 mg, Oral, Q4H PRN  hydrOXYzine (ATARAX) tablet 50 mg, 50 mg, Oral, TID PRN  amoxicillin-clavulanate (AUGMENTIN) 875-125 MG per tablet 1 tablet, 1 tablet, Oral, 2 times per day    ASSESSMENT  Severe recurrent major depression without psychotic features ([facility] [address]     PLAN  Patient's symptoms improved. Patient verbalizes she would like to claim for unemployment with . Attempt to develop insight. Psycho-education conducted. Supportive Therapy conducted. Probable discharge is to be determined. Follow-up daily while inpatient. Electronically signed by RENARD Harrington CNP on 3/21/21 at 4:52 PM EDT    **This report has been created using voice recognition software. It may contain minor errors which are inherent in voice recognition technology. **                                         Psychiatry Attending Attestation     I independently saw and evaluated the patient. I reviewed the nurse practitioner's documentation above. Any additional comments or changes to the nurse practitioners documentation are stated below otherwise agree with assessment. Patient continues to remain largely isolated to her room. Notes her suicidal thoughts are slowly getting better. Continues to report that she is not feeling great. Endorses feelings of helplessness hopelessness and worthlessness. Reports poor energy and concentration. She continues to show interest in going to a sober living facility from here. Identifies her drug of choice is fentanyl. Social work to assist her with this. We will continue same medication today and observe.     Electronically signed by Kade Parmar MD on 3/22/21 at 4:09 PM EDT

## 2021-03-22 NOTE — GROUP NOTE
Group Therapy Note    Date: 3/22/2021    Group Start Time: 0900  Group End Time: 0930  Group Topic: Community Meeting    [address] [facility], [facility]     Patient refused to attend [employer] at 0900 after encouragement from staff. 1:1 talk time offered.     Signature:  Socorro Perez

## 2021-03-22 NOTE — GROUP NOTE
Group Therapy Note    Date: 3/22/2021    Group Start Time: 1430  Group End Time: 6478  Group Topic: Cognitive Skills    ISAURA Costa, [facility]S    Patient refused to attend Recreational Therapy Group at 1430 after encouragement from staff. 1:1 talk time offered.     Signature:  Sena Iqbal

## 2021-03-22 NOTE — GROUP NOTE
Group Therapy Note    Date: 3/22/2021    Group Start Time: 1330  Group End Time: 4366  Group Topic: Cognitive Skills    ISAURA Iverson, [address]        Group Therapy Note    Attendees: 10/20         Pt did not attend RT skills group d/t resting in room despite staff invitation to attend. 1:1 talk time offered as alternative to group session, pt declined.

## 2021-03-22 NOTE — GROUP NOTE
Group Therapy Note    Date: 3/22/2021    Group Start Time: 1000  Group End Time: 4513  Group Topic: Psychotherapy    [facility] BHDESTINY Valverde LSW        Group Therapy Note    patient refused to attend psychotherapy group at 10:00am after encouragement from staff.          Signature:  DESTINY Sanders LSW

## 2021-03-23 PROCEDURE — 1240000000 HC EMOTIONAL WELLNESS R&B

## 2021-03-23 PROCEDURE — 99232 SBSQ HOSP IP/OBS MODERATE 35: CPT | Performed by: PSYCHIATRY & NEUROLOGY

## 2021-03-23 RX ORDER — LOPERAMIDE HYDROCHLORIDE 2 MG/1
2 CAPSULE ORAL 4 TIMES DAILY PRN
Status: DISCONTINUED | OUTPATIENT
Start: 2021-03-23 | End: 2021-03-25 | Stop reason: HOSPADM

## 2021-03-23 NOTE — GROUP NOTE
Group Therapy Note    Date: 3/23/2021    Group Start Time: 1330  Group End Time: 5901  Group Topic: Cognitive Skills    Cayla Morgan        Group Therapy Note    Attendees: 7/15    Pt did not attend RT skills group d/t resting in room despite staff invitation to attend. 1:1 talk time offered as alternative to group session, pt declined.

## 2021-03-23 NOTE — GROUP NOTE
Group Therapy Note    Date: 3/23/2021    Group Start Time: 1100  Group End Time: 8205  Group Topic: Psychoeducation    ISAURA Iverson, [address]         Group Therapy Note    Attendees: 3/9         Pt did not attend RT skills group d/t resting in room despite staff invitation to attend. 1:1 talk time offered as alternative to group session, pt declined.

## 2021-03-23 NOTE — PLAN OF CARE
Problem: Suicide risk  Goal: Provide patient with safe environment  Description: Provide patient with safe environment  3/23/2021 0922 by Andrea Dela Cruz RN  Outcome: Ongoing  Patient denies any suicidal and/or homicidal thoughts. Patient states she feels very depressed and anxious, states she wants to be back on Wellbutrin for mood. Patient remains isolated to room, flat, and appears sad. Patient denies hallucinations. Refused medications this morning due to upset stomach, states she will take them later. Patient offered treatment for nausea and declined. Patient states she slept terrible last night, and for only a few hours. Describes appetite as decreased due to nausea. Patient continued to be rounded on frequently. Encouraged to attend groups today and perform hygiene. Problem: Tobacco Use:  Goal: Inpatient tobacco use cessation counseling participation  Description: Inpatient tobacco use cessation counseling participation  Outcome: Ongoing  Patient educated on smoking cessation methods and coping. Problem: Pain:  Goal: Pain level will decrease  Description: Pain level will decrease  Outcome: Ongoing  Patient denies pain, will continue to monitor.

## 2021-03-23 NOTE — BH NOTE
Patient refused morning medications due to complaints of nausea. Multiple attempts made. Offered treatment for nausea and food to take medications with, patient declined.

## 2021-03-23 NOTE — CARE COORDINATION
SW met with patient for one on one requested by patient regarding being linked to the [facility]. Patient stated she had no completed the application but asked questions about the process which SW answered. Patient also asked about going to a shelter and asked if she chose to go to a shelter instead could she leave today. SW encouraged patient to direct discharge questions/conversation to her doctor and encouraged her to complete the application for [facility] if she was interested and turn in to SW to fax. Patient stated she was \"done with being here\" and remained discharged focus.

## 2021-03-23 NOTE — PLAN OF CARE
Problem: Suicide risk  Goal: Provide patient with safe environment  Description: Provide patient with safe environment  3/22/2021 2127 by Srinivas Lemons  Outcome: Met This Shift  Pt admits to fleeting suicidal ideation. Verbally contracts for safety. Pt remains safe & free from self harm behaviors. She isolates to room majority of shift. She remains aloof from peers when out of room briefly. Pt refused group attendance. She is non-relating of stressors.

## 2021-03-23 NOTE — PROGRESS NOTES
Daily Progress Note  3/23/2021  CHIEF COMPLAINT: Suicide attempt    Reviewed patient's current plan of care and vital signs with nursing staff. Vitals:    03/23/21 0733   BP: 108/62   Pulse: 66   Resp: 14   Temp: 98 °F (36.7 °C)   SpO2:        Sleep: Patient reports restorative sleep for about 6 hours last night. Attending groups: No    SUBJECTIVE:    Leonard Jordan was seen in her room today. Patient has been behaviorally in control, but has not taken her scheduled medication for today. Per staff documentation, patient refused morning medications due to complaints of nausea. RN documented that multiple attempts were made and treatment for nausea was offered along with food to take medications with but the patient declined. Patient received the following as needed medication:    Nicorette gum administered on March 22 at 5:54 PM.    When writer approached patient, patient was resting in bed. She reported her mood as \"sick\". Patient was complaining of diarrhea and reports she had a couple loose stools and reports feeling warm. Patient did not report any complaints of nausea to writer. Per vital signs, patient is afebrile. Patient's last recorded white blood count is 7.8. Per staff documentation, patient has been isolative, flat, and endorsing depression and anxiety. Patient reports sleeping for about 4 hours last night and adequate appetite. Patient reports improvement in suicidal ideation and contracts for safety on the unit. Patient reports her depression as a 7 out of 10 today (010 scale with 0 being no depression and 10 being the worst). Patient rates her anxiety as a 7 out of 10 (010 scale with 0 being no anxiety and 10 being the worst). Patient denies suicidal plan, homicidal ideation, homicidal plan, auditory hallucinations, or visual hallucinations. Patient denies any side effects to her medication regimen. Patient denies any medical concerns at this time.   Patient reports she has not been attending groups on the unit. Patient continues to be monitored in the inpatient psychiatric facility at [facility] for safety and stabilization. Patient verbalized yesterday to writer she would like to claim for unemployment with . Per  documentation, \"SW met with patient for one on one requested by patient regarding being linked to the [facility]. Patient stated she had no completed the application but asked questions about the process which SW answered. Patient also asked about going to a shelter and asked if she chose to go to a shelter instead could she leave today. SW encouraged patient to direct discharge questions/conversation to her doctor and encouraged her to complete the application for [employer] if she was interested and turn in to SW to fax. Patient stated she was \"done with being here\" and remained discharged focus\". Mental Status Exam  Level of consciousness: Alert and oriented  Appearance: Hospital attire, resting in bed, with fair grooming and hygiene   Behavior/Motor: Pleasant upon approach, no psychomotor abnormalities noted  Attitude toward examiner:  Cooperative, appropriate eye contact  Speech: Spontaneous, normal rate, normal volume and well articulated  Mood: Patient reports her mood as \"sick\". Patient presents with depressed mood. Affect: Congruent to affect. Thought processes:  Linear, goal directed and coherent  Thought content:  Denies homicidal ideation  Suicidal Ideation: Reports improvement in suicidal ideation  Delusions:  No evidence of delusions  Perceptual Disturbance:  Denies any perceptual disturbance  Cognition:  Oriented to self, location, time, and situation  Memory: Intact  Insight & Judgement: Poor  Medication side effects:  Denies       Data   height is 5' 4\" (1.626 m) and weight is 180 lb (81.6 kg). Her oral temperature is 98 °F (36.7 °C). Her blood pressure is 108/62 and her pulse is 66.  Her respiration is 14 and oxygen saturation is 98%. Labs:   No visits with results within 2 Day(s) from this visit. Latest known visit with results is:   No results displayed because visit has over 200 results. Medications  Current Facility-Administered Medications: loperamide (IMODIUM) capsule 2 mg, 2 mg, Oral, 4x Daily PRN  sertraline (ZOLOFT) tablet 50 mg, 50 mg, Oral, Daily  nicotine polacrilex (NICORETTE) gum 2 mg, 2 mg, Oral, PRN  aluminum & magnesium hydroxide-simethicone (MAALOX) 200-200-20 MG/5ML suspension 30 mL, 30 mL, Oral, Q6H PRN  polyethylene glycol (GLYCOLAX) packet 17 g, 17 g, Oral, Daily PRN  traZODone (DESYREL) tablet 50 mg, 50 mg, Oral, Nightly PRN  ibuprofen (ADVIL;MOTRIN) tablet 400 mg, 400 mg, Oral, Q6H PRN  acetaminophen (TYLENOL) tablet 650 mg, 650 mg, Oral, Q4H PRN  hydrOXYzine (ATARAX) tablet 50 mg, 50 mg, Oral, TID PRN  amoxicillin-clavulanate (AUGMENTIN) 875-125 MG per tablet 1 tablet, 1 tablet, Oral, 2 times per day    ASSESSMENT  Severe recurrent major depression without psychotic features ([facility] [address]     PLAN  Patient's symptoms unchanged. Imodium ordered for reported diarrhea. Patient verbalizes she would like to claim for unemployment with . Attempt to develop insight. Psycho-education conducted. Supportive Therapy conducted. Probable discharge is to be determined. Follow-up daily while inpatient. Psychiatry Attending Attestation     I independently saw and evaluated the patient. I reviewed the nurse practitioner's documentation above. Any additional comments or changes to the nurse practitioners documentation are stated below otherwise agree with assessment. Patient feels better than before. Mood and affect are better. Patient reports fleeting suicidal thoughts with no intent or plan. Patient notes that these thoughts are occurring less frequently. Denies any homicidal thoughts, that was explored with the patient.   Oriented to time place and person. Recent and remote memory is intact. Patient feels hopeful. Sleep and appetite is good. No side effect from medication reported. Side-effect of medication were discussed with the patient . Patient is responding to current treatment. Discharge soon, if patient continues to show improvement. Case discussed with the staff.     Electronically signed by Parth Byrne MD on 3/23/21 at 5:52 PM EDT

## 2021-03-23 NOTE — GROUP NOTE
Group Therapy Note    Date: 3/23/2021    Group Start Time: 0845  Group End Time: 0963  Group Topic: Community Meeting    ISAURA Iverson, [address]        Group Therapy Note    Attendees: 8/19    Pt did not attend [employer] d/t resting in room despite staff invitation to attend. 1:1 talk time offered as alternative to group session, pt declined.

## 2021-03-24 LAB
CULTURE: NORMAL
CULTURE: NORMAL
Lab: NORMAL
Lab: NORMAL
SPECIMEN DESCRIPTION: NORMAL
SPECIMEN DESCRIPTION: NORMAL

## 2021-03-24 PROCEDURE — 99232 SBSQ HOSP IP/OBS MODERATE 35: CPT | Performed by: PSYCHIATRY & NEUROLOGY

## 2021-03-24 PROCEDURE — 1240000000 HC EMOTIONAL WELLNESS R&B

## 2021-03-24 PROCEDURE — 6370000000 HC RX 637 (ALT 250 FOR IP): Performed by: PSYCHIATRY & NEUROLOGY

## 2021-03-24 RX ORDER — HYDROXYZINE 50 MG/1
50 TABLET, FILM COATED ORAL 3 TIMES DAILY PRN
Qty: 45 TABLET | Refills: 0 | Status: SHIPPED | OUTPATIENT
Start: 2021-03-24 | End: 2021-03-25

## 2021-03-24 RX ORDER — TRAZODONE HYDROCHLORIDE 50 MG/1
50 TABLET ORAL NIGHTLY PRN
Qty: 30 TABLET | Refills: 0 | Status: SHIPPED | OUTPATIENT
Start: 2021-03-24 | End: 2021-03-25

## 2021-03-24 RX ADMIN — SERTRALINE 50 MG: 50 TABLET, FILM COATED ORAL at 08:30

## 2021-03-24 RX ADMIN — TRAZODONE HYDROCHLORIDE 50 MG: 50 TABLET ORAL at 00:03

## 2021-03-24 RX ADMIN — NICOTINE POLACRILEX 2 MG: 2 GUM, CHEWING BUCCAL at 15:59

## 2021-03-24 RX ADMIN — HYDROXYZINE HYDROCHLORIDE 50 MG: 50 TABLET, FILM COATED ORAL at 20:58

## 2021-03-24 RX ADMIN — TRAZODONE HYDROCHLORIDE 50 MG: 50 TABLET ORAL at 20:58

## 2021-03-24 RX ADMIN — NICOTINE POLACRILEX 2 MG: 2 GUM, CHEWING BUCCAL at 19:46

## 2021-03-24 RX ADMIN — HYDROXYZINE HYDROCHLORIDE 50 MG: 50 TABLET, FILM COATED ORAL at 00:03

## 2021-03-24 RX ADMIN — HYDROXYZINE HYDROCHLORIDE 50 MG: 50 TABLET, FILM COATED ORAL at 15:59

## 2021-03-24 ASSESSMENT — PAIN SCALES - GENERAL: PAINLEVEL_OUTOF10: 0

## 2021-03-24 NOTE — PLAN OF CARE
Problem: Suicide risk  Goal: Provide patient with safe environment  Description: Provide patient with safe environment  Outcome: Ongoing   Patient remains safe on unit. Patient safety maintained q15 minute checks. Problem: Altered Mood, Depressive Behavior:  Goal: Able to verbalize and/or display a decrease in depressive symptoms  Description: Able to verbalize and/or display a decrease in depressive symptoms  Outcome: Ongoing  Patient denies suicidal ideations. Patient agrees to notify staff if symptoms arise. Patient reports depression and anxiety. Patient is isolative to room except for needs.

## 2021-03-24 NOTE — GROUP NOTE
Group Therapy Note    Date: 3/24/2021    Group Start Time: 1430  Group End Time: 6426  Group Topic: Recreational    [facility]    [place], [place]        Group Therapy Note    Attendees: 2/10         Pt did not attend RT skills group d/t resting in room despite staff invitation to attend. 1:1 talk time offered as alternative to group session, pt declined.

## 2021-03-24 NOTE — GROUP NOTE
Group Therapy Note    Date: 3/24/2021    Group Start Time: 1100  Group End Time: 2483  Group Topic: Psychoeducation    [facility]    [place], [place]        Group Therapy Note    Attendees: 6/9         Pt did not attend RT skills group d/t resting in room despite staff invitation to attend. 1:1 talk time offered as alternative to group session, pt declined.

## 2021-03-24 NOTE — DISCHARGE SUMMARY
[address]     Department of Internal Medicine - Staff Internal Medicine Teaching Service    INPATIENT DISCHARGE SUMMARY      Patient Identification:  Jose Viera is a 31 y.o. female. :  1990  MRN: [id number]     Acct: [de-identified]   PCP: Edinson Velázquez MD  Admit Date:  3/17/2021  Discharge date and time: 3/19/2021  7:21 PM   Attending Provider: No att. providers found                                     [address] Problem Lists:  Principal Problem:    Overdose of trazodone  Active Problems:    Admitted to substance misuse detoxification center    Attention-deficit hyperactivity disorder    Heroin abuse ([facility] [address]    Elevated LFTs    Respiratory decompensation    Bipolar 1 disorder ([facility] [address]    Depression    Nicotine use disorder    Non-compliance    Suicidal ideations    Aspiration pneumonia ([facility] [address]    Hypokalemia    Cocaine use    Anemia    Severe recurrent major depression without psychotic features ([facility] [address]  Resolved Problems:    * No resolved hospital problems. *      HOSPITAL STAY     Brief Inpatient course:   79-year-old male with history of ADD, drug abuse, borderline disorder presented to the ED with altered state secondary to trazodone overdose. Patient was having shortness of breath and was intubated and taken to the ICU due to respiratory for repair and to protect airway. Patient had prolonged QRS on EKG. Poison control was contacted. Patient was having metabolic acidosis and was managed in the ICU. Patient was extubated yesterday as is transferred to the floor. Patient is hemodynamically stable and is saturating well on room air. Suicide precautions and sitter in place. Will get psych consult. Patient was being treated with Unasyn for concerns of aspiration pneumonia. Psych was consulted on recommended inpatient admission to [facility]. Patient was stabilized and is being transferred to [facility] under stable conditions.     Procedures/ Significant Interventions:    Intubation    Consults:     Consults:     Final Specialist Recommendations/Findings:   IP CONSULT TO CRITICAL CARE  IP CONSULT TO PSYCHIATRY  IP CONSULT TO CASE MANAGEMENT  IP CONSULT TO PSYCHIATRY      Any Hospital Acquired Infections: none    Discharge Functional Status:  stable    DISCHARGE PLAN     Disposition: [facility]    Patient Instructions:   Discharge Medication List as of 3/19/2021  5:21 PM      START taking these medications    Details   linezolid (ZYVOX) 600 MG tablet Take 1 tablet by mouth 2 times daily for 14 days, Disp-28 tablet, R-0Normal         CONTINUE these medications which have NOT CHANGED    Details   cariprazine hcl (VRAYLAR) 1.5 MG capsule Take 1.5 mg by mouth dailyHistorical Med      busPIRone (BUSPAR) 10 MG tablet Take 5 mg by mouth 2 times dailyHistorical Med      buPROPion (WELLBUTRIN XL) 300 MG extended release tablet 1 tablet in the morningHistorical Med      guanFACINE (INTUNIV) 2 MG TB24 extended release tablet Take 2 mg by mouth dailyHistorical Med      lamoTRIgine (LAMICTAL) 100 MG tablet Take 50 mg by mouth 2 times daily Historical Med         STOP taking these medications       vilazodone HCl (VILAZODONE HCL) 20 MG TABS Comments:   Reason for Stopping:         traZODone (DESYREL) 100 MG tablet Comments:   Reason for Stopping:               Activity: activity as tolerated    Diet: regular diet    Follow-up:    No follow-up provider specified. Follow up labs: none  Follow up imaging: none    Note that over 30 minutes was spent in preparing discharge papers, discussing discharge with patient, medication review, etc.      Hansel Alcala MD,   Internal Medicine Resident, PGY-1  [address], [place]  3/26/2021, 1:46 PM

## 2021-03-24 NOTE — GROUP NOTE
Group Therapy Note    Date: 3/24/2021    Group Start Time: 1000  Group End Time: 1111  Group Topic: Psychotherapy    ISAURA Russo, [facility]        Group Therapy Note    Patient declined to attend Psychotherapy group at 1000 am despite encouragement. Patient was offered a 1:1 time to meet after group or alternative activity to do and patient refused.      Signature:  Fariha Russo MRC, CRC, [address]

## 2021-03-24 NOTE — CARE COORDINATION
JASON received a phone call from Mariely Fuchs at [address] [place], [place] [address]bob [facility] [phone] ext. [phone] regarding patient's application for the program.  She stated they will have a bed available tomorrow and patient will need to arrive by 10:30 PM.  Patient stated yesterday to JASON that if she is not able to get into the program she will go to a shelter. JASON will work with patient to get into a shelter upon discharge today until she can arrive at [facility].

## 2021-03-24 NOTE — PLAN OF CARE
Problem: Tobacco Use:  Goal: Inpatient tobacco use cessation counseling participation  Description: Inpatient tobacco use cessation counseling participation  Outcome: Ongoing   Educated on smoking cessation coping and the importance of quitting. Problem: Altered Mood, Depressive Behavior:  Goal: Able to verbalize and/or display a decrease in depressive symptoms  Description: Able to verbalize and/or display a decrease in depressive symptoms  3/24/2021 0852 by Priscilla Baer RN  Outcome: Ongoing  Patient denies suicidal/homicidal ideations, rounded on every 15 minutes and educated to verbalize ideations to staff. Patient is anxious, states she does not want to discharge today, only wanted to discharge \"to get high,\" also does not want to discharge to a shelter but to [facility] instead. Patient states she is still depressed and would like to start on Wellbutrin. Denies any hallucinations. Patient states sleep and appetite as adequate. Encouraged to come to groups, perform hygiene, and be less withdrawn and isolative today.

## 2021-03-24 NOTE — GROUP NOTE
Group Therapy Note    Date: 3/24/2021    Group Start Time: 0900  Group End Time: 0920  Group Topic: Community Meeting    [facility] [place]OR[place]    Patient refused to attend Community Meeting Group at 0900 after encouragement from staff. 1:1 talk time offered.     Signature:  Tyesha Garrido

## 2021-03-24 NOTE — GROUP NOTE
Group Therapy Note    Date: 3/24/2021    Group Start Time: 1330  Group End Time: 1600  Group Topic: Recreational    [address] [facility], [facility]    Patient refused to attend Recreational Therapy Group at 1330 after encouragement from staff. 1:1 talk time offered.     Signature:  Tyesha Garrido

## 2021-03-24 NOTE — BH NOTE
Blood pressure low 87/44, nurse practitioner notified. Patient encouraged to drink more fluids and move around. Will continue to monitor. Rechecked manually 102/74.

## 2021-03-24 NOTE — PROGRESS NOTES
also reports that she does not want to discharge to a shelter and would prefer to go to [facility] instead. Patient is noted to be hypotensive today with a blood pressure of 87/44. Patient encouraged to consume fluids and RN aware to encourage fluids and recheck blood pressure. We will continue to monitor vital signs. Mental Status Exam  Level of consciousness: Alert and oriented  Appearance: Hospital attire, resting in bed, with fair grooming and hygiene   Behavior/Motor: Pleasant upon approach, no psychomotor abnormalities noted  Attitude toward examiner:  Cooperative, appropriate eye contact  Speech: Spontaneous, normal rate, normal volume and well articulated  Mood: Patient reports her mood as \"depressed\". Affect: Congruent to affect. Thought processes:  Linear, goal directed and coherent  Thought content:  Denies homicidal ideation  Suicidal Ideation: Denies suicidal ideation  Delusions:  No evidence of delusions  Perceptual Disturbance:  Denies any perceptual disturbance  Cognition:  Oriented to self, location, time, and situation  Memory: Intact  Insight & Judgement: Poor  Medication side effects:  Denies       Data   height is 5' 4\" (1.626 m) and weight is 180 lb (81.6 kg). Her oral temperature is 98 °F (36.7 °C). Her blood pressure is 87/44 (abnormal) and her pulse is 61. Her respiration is 20 and oxygen saturation is 98%. Labs:   No visits with results within 2 Day(s) from this visit. Latest known visit with results is:   No results displayed because visit has over 200 results.                Medications  Current Facility-Administered Medications: loperamide (IMODIUM) capsule 2 mg, 2 mg, Oral, 4x Daily PRN  sertraline (ZOLOFT) tablet 50 mg, 50 mg, Oral, Daily  nicotine polacrilex (NICORETTE) gum 2 mg, 2 mg, Oral, PRN  aluminum & magnesium hydroxide-simethicone (MAALOX) 200-200-20 MG/5ML suspension 30 mL, 30 mL, Oral, Q6H PRN  polyethylene glycol (GLYCOLAX) packet 17 g, 17 g, Oral, Daily PRN treatment. Discharge soon, if patient continues to show improvement. Case discussed with the staff.     Electronically signed by Fran Jenkins MD on 3/24/21 at 9:24 PM EDT

## 2021-03-25 VITALS
HEART RATE: 61 BPM | RESPIRATION RATE: 14 BRPM | BODY MASS INDEX: 30.73 KG/M2 | HEIGHT: 64 IN | OXYGEN SATURATION: 98 % | DIASTOLIC BLOOD PRESSURE: 54 MMHG | TEMPERATURE: 98.4 F | SYSTOLIC BLOOD PRESSURE: 90 MMHG | WEIGHT: 180 LBS

## 2021-03-25 PROCEDURE — 6370000000 HC RX 637 (ALT 250 FOR IP): Performed by: PSYCHIATRY & NEUROLOGY

## 2021-03-25 PROCEDURE — 99239 HOSP IP/OBS DSCHRG MGMT >30: CPT | Performed by: PSYCHIATRY & NEUROLOGY

## 2021-03-25 RX ORDER — TRAZODONE HYDROCHLORIDE 50 MG/1
50 TABLET ORAL NIGHTLY PRN
Qty: 30 TABLET | Refills: 0 | Status: SHIPPED | OUTPATIENT
Start: 2021-03-25

## 2021-03-25 RX ORDER — HYDROXYZINE 50 MG/1
50 TABLET, FILM COATED ORAL 3 TIMES DAILY PRN
Qty: 45 TABLET | Refills: 0 | Status: SHIPPED | OUTPATIENT
Start: 2021-03-25 | End: 2021-04-09

## 2021-03-25 RX ADMIN — SERTRALINE 50 MG: 50 TABLET, FILM COATED ORAL at 08:24

## 2021-03-25 NOTE — SUICIDE SAFETY PLAN
SAFETY PLAN    A suicide Safety Plan is a document that supports someone when they are having thoughts of suicide. Warning Signs that indicate a suicidal crisis may be developing: What (situations, thoughts, feelings, body sensations, behaviors, etc.) do you experience that lets you know you are beginning to think about suicide? 1. Depression  2. isolation  3. Anxiety    Internal Coping Strategies:  What things can I do (relaxation techniques, hobbies, physical activities, etc.) to take my mind off my problems without contacting another person? 1. music  2. art  3. writing    People and social settings that provide distraction: Who can I call or where can I go to distract me? 1. Name: Santiago Velazquez  Phone: [phone]  2. Name: friend Manan Chen   Phone: located in cell phone  3. Place: park           4. Place: store/shopping    Professionals or Behavioral Health agencies I can contact during a crisis: Who can I call for help - for example, my doctor, my psychiatrist, my psychologist, a mental health provider, a suicide hotline? [address] [facility] [address] [facility]    2. [facility]  [phone]      3. Suicide Prevention Lifeline: 1-808-787-UDTH (1104)    4. Local Behavioral Health Emergency Services:    [facility] Hotline: [address]   [phone]    Making the environment safe: How can I make my environment (house/apartment/living space) safer? For example, can I remove guns, medications, and other items? 1. Keep drugs out of reach and out of sight  2.  Stay away from people/friends that use

## 2021-03-25 NOTE — DISCHARGE SUMMARY
of Trazodone.     Patient was seen and evaluated by psychiatric consult services on 3/19/2021. Per the consult service:     Patient refuses to talk about the events leading up to her overdose. Rohith Nicole was cooperative with getting some elements of history but would often state that she either could not remember or did not want to talk about things.  She did tell me that she had completed 3 weeks at [facility] recovery and then was transferred to a sober living house. Rohith Nicole reported she did not like the sober living house and left and relapsed. She was frustrated with herself and states this was one of the reasons that she overdosed with an intention of ending her life. .  She does report suffering from both substance use as well as major depression throughout her whole life.  She affirms depressed mood, anhedonia, decreased energy and concentration, poor appetite, suicidal ideations, poor sleep with depressive episodes and feels that she is in a depressive episode now. Rohith Nicole denies any history of psychosis now or in the past.  She denies episodes of sanam. Denies trauma     Today:   When asked what led to her admission, Maury Hurst states she overdosed on like 60 trazodone. She is unsure how long she had suicidal ideation prior to her attempt because she has BPD and constantly has suicidal ideation. She states she was diagnosed with BPD 12-13 years ago. She does not want to talk about recent stressors that led to to her suicide attempt. She states she is always depressed. She is unsure when this most recent episode of depression started but endorses feeling down and sad for more days than not. She reports she was sleeping fine because she was on drugs. Her appetite has not been good. She endorses low energy and motivation throughout the day. Has poor attention and concentration. She has been feeling worthless, hopeless and helpless.      She has a history of depression, anxiety and BPD.  She does not currently have an [facility] psychiatric provider. She has been off psychotropic medication for a few months. She reports Murtis Go worked well in the past for her suicidal ideation. She has had multiple past suicide attempts and inpatient psychiatric admissions.      Christine Never reports she is feeling really tired today. She asks if she has pneumonia. Her respiratory culture done at [facility]. Kirill's was positive for staph. There was concern for aspiration pneumonia and was put on Augmentin BID. She feels exhausted and was coughing up blood last night. Will consult internal medicine. Christine Never continues to feel depressed today. She denies current thoughts of harm to herself or others. Denies any hallucinations. Does not exhibit any symptoms of sanam or hypomania. No evidence of delusions or overt psychosis on examination. She is receptive to starting an antidepressant to help with her mood. She denies any opiate withdrawal symptoms today. Hospital Course:   Upon admission, Bonny Lawson was provided a safe secure environment, introduced to unit milieu. Patient participated in groups and individual therapies. Meds were adjusted as noted below. After few days of hospital care, patient began to feel improvement. Depression lifted, thoughts to harm self ceased. Sleep improved, appetite was good. On morning rounds 3/25/2021, Bonny Lawson endorses feeling ready for discharge. Patient denies suicidal or homicidal ideations, denies hallucinations or delusions. Denies SE's from meds. It was decided that maximum benefit from hospital care had been achieved and patient can be discharged. Consults:   none    Significant Diagnostic Studies: Routine labs and diagnostics    Treatments: Psychotropic medications, therapy with group, milieu, and 1:1 with nurses, social workers, PABRANDON/CNP, and Attending physician.       Discharge Medications:  Discharge Medication List as of 3/25/2021  9:41 AM      CONTINUE these medications which have CHANGED    Details hydrOXYzine (ATARAX) 50 MG tablet Take 1 tablet by mouth 3 times daily as needed for Anxiety, Disp-45 tablet, R-0Normal      traZODone (DESYREL) 50 MG tablet Take 1 tablet by mouth nightly as needed for Sleep, Disp-30 tablet, R-0Normal      sertraline (ZOLOFT) 50 MG tablet Take 1 tablet by mouth daily, Disp-30 tablet, R-0Normal         STOP taking these medications       linezolid (ZYVOX) 600 MG tablet Comments:   Reason for Stopping:         cariprazine hcl (VRAYLAR) 1.5 MG capsule Comments:   Reason for Stopping:         busPIRone (BUSPAR) 10 MG tablet Comments:   Reason for Stopping:         buPROPion (WELLBUTRIN XL) 300 MG extended release tablet Comments:   Reason for Stopping:         guanFACINE (INTUNIV) 2 MG TB24 extended release tablet Comments:   Reason for Stopping:         lamoTRIgine (LAMICTAL) 100 MG tablet Comments:   Reason for Stopping:                Core Measures statement:   Not applicable    Discharge Exam:  Level of consciousness:  Within normal limits  Appearance: Street clothes, seated, with good grooming  Behavior/Motor: No abnormalities noted  Attitude toward examiner:  Cooperative, attentive, good eye contact  Speech:  spontaneous, normal rate, normal volume and well articulated  Mood:  euthymic  Affect:  Full range  Thought processes:  linear, goal directed and coherent  Thought content:  denies homicidal ideation  Suicidal Ideation:  denies suicidal ideation  Delusions:  no evidence of delusions  Perceptual Disturbance:  denies any perceptual disturbance  Cognition:  Intact  Memory: age appropriate  Insight & Judgement: fair  Medication side effects: denies     Disposition: home    Patient Instructions: Activity: activity as tolerated  1. Patient instructed to take medications regularly and follow up with outpatient appointments.      Follow-up as scheduled with [facility]       Signed:    Electronically signed by Janine Earl MD on 3/25/21 at 3:34 PM EDT    Time Spent on discharge is more than 35 minutes in the examination, evaluation, counseling and review of medications and discharge plan.

## 2021-03-25 NOTE — PROGRESS NOTES
[facility]  Discharge Note    Pt discharged with followings belongings:   Dentures: None  Vision - Corrective Lenses: None  Hearing Aid: None  Jewelry: Earrings(1 earing)  Body Piercings Removed: No  Clothing: Footwear, Pants, Shirt, Socks  Were All Patient Medications Collected?: No  Other Valuables: Cell phone, [employer], Other (Comment)(vape pen, 2 pill bottles)   Valuables sent home with pt. Valuables returned to patient. Patient education on aftercare instructions: yes  Information faxed to [facility] by writer Patient verbalize understanding of AVS:  yes. Status EXAM upon discharge:  Status and Exam  Normal: Yes  Facial Expression: Brightened  Affect: Appropriate  Level of Consciousness: Alert  Mood:Normal: Yes  Mood: Depressed, Sad  Motor Activity:Normal: Yes  Motor Activity: Decreased  Interview Behavior: Cooperative  Preception: [place] to Person, Debborah Saupe to Time, [place] to Place, [place] to Situation  Attention:Normal: Yes  Attention: Distractible  Thought Processes: Circumstantial  Thought Content:Normal: Yes  Thought Content: Preoccupations  Hallucinations: None  Delusions: No  Memory:Normal: Yes  Memory: Poor Recent  Insight and Judgment: Yes  Insight and Judgment: Poor Insight, Unmotivated  Present Suicidal Ideation: No  Present Homicidal Ideation: No      Metabolic Screening:    No results found for: LABA1C    No results found for: CHOL  No results found for: TRIG  No results found for: HDL  No components found for: LDLCAL  No results found for: LABVLDL    Pt discharged to [address] [place] [address]. All belongings et valuables sent with pt. Pt verbalizes all discharge instructions.      Kellen Lopez RN

## 2021-03-25 NOTE — PLAN OF CARE
Problem: Suicide risk  Goal: Provide patient with safe environment  Description: Provide patient with safe environment  Outcome: Ongoing   Safe environment provided. Problem: Tobacco Use:  Goal: Inpatient tobacco use cessation counseling participation  Description: Inpatient tobacco use cessation counseling participation  Outcome: Ongoing   Declined. Problem: Pain:  Goal: Pain level will decrease  Description: Pain level will decrease  Outcome: Ongoing   Denies current pain. Problem: Altered Mood, Depressive Behavior:  Goal: Able to verbalize and/or display a decrease in depressive symptoms  Description: Able to verbalize and/or display a decrease in depressive symptoms  Outcome: Ongoing   Does not verbalize a decrease in depression being quiet/guarded spending long periods in her room.

## 2021-03-25 NOTE — CARE COORDINATION
Name: Patrick Gee    : 1990    Discharge Date: 3/25/2021     Primary Auth/Cert #:  [id number]    Destination: [facility]    Discharge Medications:      Medication List      START taking these medications    hydrOXYzine 50 MG tablet  Commonly known as: ATARAX  Take 1 tablet by mouth 3 times daily as needed for Anxiety  Notes to patient: For anxiety     sertraline 50 MG tablet  Commonly known as: ZOLOFT  Take 1 tablet by mouth daily  Notes to patient: Antidepressant     traZODone 50 MG tablet  Commonly known as: DESYREL  Take 1 tablet by mouth nightly as needed for Sleep  Notes to patient: For sleep        STOP taking these medications    busPIRone 10 MG tablet  Commonly known as: BUSPAR     Intuniv 2 MG Tb24 extended release tablet  Generic drug: guanFACINE     LaMICtal 100 MG tablet  Generic drug: lamoTRIgine     linezolid 600 MG tablet  Commonly known as: Zyvox     Vraylar 1.5 MG capsule  Generic drug: cariprazine hcl     Wellbutrin  MG extended release tablet  Generic drug: buPROPion           Where to Get Your Medications      These medications were sent to [facility] - [address] [place] [address], [place] [address]    Phone: [phone]   · hydrOXYzine 50 MG tablet  · sertraline 50 MG tablet  · traZODone 50 MG tablet         Follow Up Appointment: [facility] OFFICE  [address] [place] [address] [address] [phone]  Go on 3/29/2021  @8:15 AM for med clinic- please bring insurance card, photo ID, and a mask

## 2021-03-25 NOTE — PROGRESS NOTES
Patient given tobacco quitline number 46778591721 at this time, refusing to call at this time, states \" I just dont want to quit now\"- patient given information as to the dangers of long term tobacco use. Continue to reinforce the importance of tobacco cessation.

## 2024-10-30 NOTE — BH NOTE
Group Refusal  Patient does not attend wellness group. One to one talk time offered. Pt biba for ams, weakness, high glu and recent uti on bactrim.  Afebrile, + sleepy but wakes to touch.  Exam w mild suprapubic ttp concerning for ongoing uti.  FS "high".  Labs sent, ivf started.  Initial vbg w pH 7, low bicarb c/w dka.  Insulin gtt ordered,  other labs including ua pending.   Plan ICU consult, admit to ICU once accepted for dka.